# Patient Record
Sex: MALE
[De-identification: names, ages, dates, MRNs, and addresses within clinical notes are randomized per-mention and may not be internally consistent; named-entity substitution may affect disease eponyms.]

---

## 2018-08-11 ENCOUNTER — HOSPITAL ENCOUNTER (EMERGENCY)
Dept: HOSPITAL 97 - ER | Age: 38
Discharge: HOME | End: 2018-08-11
Payer: COMMERCIAL

## 2018-08-11 DIAGNOSIS — R51: Primary | ICD-10-CM

## 2018-08-11 DIAGNOSIS — I11.9: ICD-10-CM

## 2018-08-11 LAB
ALBUMIN SERPL BCP-MCNC: 3.1 G/DL (ref 3.4–5)
ALP SERPL-CCNC: 118 U/L (ref 45–117)
ALT SERPL W P-5'-P-CCNC: 39 U/L (ref 12–78)
AST SERPL W P-5'-P-CCNC: 23 U/L (ref 15–37)
BUN BLD-MCNC: 12 MG/DL (ref 7–18)
CKMB CREATINE KINASE MB: 1.7 NG/ML (ref 0.3–3.6)
GLUCOSE SERPLBLD-MCNC: 122 MG/DL (ref 74–106)
HCT VFR BLD CALC: 47 % (ref 39.6–49)
INR BLD: 1.1
LYMPHOCYTES # SPEC AUTO: 1.1 K/UL (ref 0.7–4.9)
MAGNESIUM SERPL-MCNC: 1.9 MG/DL (ref 1.8–2.4)
MCH RBC QN AUTO: 26.1 PG (ref 27–35)
MCV RBC: 82.8 FL (ref 80–100)
NT-PROBNP SERPL-MCNC: 130 PG/ML (ref ?–125)
PMV BLD: 7.7 FL (ref 7.6–11.3)
POTASSIUM SERPL-SCNC: 3.9 MMOL/L (ref 3.5–5.1)
RBC # BLD: 5.68 M/UL (ref 4.33–5.43)

## 2018-08-11 PROCEDURE — 82553 CREATINE MB FRACTION: CPT

## 2018-08-11 PROCEDURE — 80076 HEPATIC FUNCTION PANEL: CPT

## 2018-08-11 PROCEDURE — 82550 ASSAY OF CK (CPK): CPT

## 2018-08-11 PROCEDURE — 80048 BASIC METABOLIC PNL TOTAL CA: CPT

## 2018-08-11 PROCEDURE — 70450 CT HEAD/BRAIN W/O DYE: CPT

## 2018-08-11 PROCEDURE — 84484 ASSAY OF TROPONIN QUANT: CPT

## 2018-08-11 PROCEDURE — 93005 ELECTROCARDIOGRAM TRACING: CPT

## 2018-08-11 PROCEDURE — 83735 ASSAY OF MAGNESIUM: CPT

## 2018-08-11 PROCEDURE — 85730 THROMBOPLASTIN TIME PARTIAL: CPT

## 2018-08-11 PROCEDURE — 96361 HYDRATE IV INFUSION ADD-ON: CPT

## 2018-08-11 PROCEDURE — 81003 URINALYSIS AUTO W/O SCOPE: CPT

## 2018-08-11 PROCEDURE — 71045 X-RAY EXAM CHEST 1 VIEW: CPT

## 2018-08-11 PROCEDURE — 36415 COLL VENOUS BLD VENIPUNCTURE: CPT

## 2018-08-11 PROCEDURE — 99284 EMERGENCY DEPT VISIT MOD MDM: CPT

## 2018-08-11 PROCEDURE — 85025 COMPLETE CBC W/AUTO DIFF WBC: CPT

## 2018-08-11 PROCEDURE — 96360 HYDRATION IV INFUSION INIT: CPT

## 2018-08-11 PROCEDURE — 85610 PROTHROMBIN TIME: CPT

## 2018-08-11 PROCEDURE — 83880 ASSAY OF NATRIURETIC PEPTIDE: CPT

## 2018-08-11 NOTE — RAD REPORT
EXAM DESCRIPTION:  RAD - Chest Single View - 8/11/2018 8:38 pm

 

CLINICAL HISTORY:  hypertension

Chest pain.

 

COMPARISON:  No comparisons

 

FINDINGS:  Portable technique limits examination quality.

 

Mild interstitial pulmonary edema suspected. The heart appears significantly enlarged. No displaced f
ractures.

 

IMPRESSION:  Mild CHF suspected.

## 2018-08-11 NOTE — RAD REPORT
EXAM DESCRIPTION:  CT - Head Brain Wo Cont - 8/11/2018 8:16 pm

 

CLINICAL HISTORY:  elevated blood pressure;Headache

 

COMPARISON:  <Comparisons>

 

TECHNIQUE:  All CT scans are performed using dose optimization technique as appropriate and may inclu
de automated exposure control or mA/KV adjustment according to patient size.

 

FINDINGS:  No intracranial hemorrhage, hydrocephalus or extra-axial fluid collection.No areas of brai
n edema or evidence of midline shift.

 

The paranasal sinuses and mastoids are clear. The calvarium is intact.

 

IMPRESSION:  No acute intracranial abnormality.
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
## 2018-08-12 NOTE — EKG
Test Date:    2018-08-11               Test Time:    20:38:08

Technician:                                          

                                                     

MEASUREMENT RESULTS:                                       

Intervals:                                           

Rate:         88                                     

OH:           150                                    

QRSD:         94                                     

QT:           368                                    

QTc:          445                                    

Axis:                                                

P:            50                                     

OH:           150                                    

QRS:          74                                     

T:            32                                     

                                                     

INTERPRETIVE STATEMENTS:                                       

                                                     

Normal sinus rhythm

Normal ECG

No previous ECG available for comparison



Electronically Signed On 08-12-18 06:09:17 CDT by Sanjeev Mir

## 2018-09-30 ENCOUNTER — HOSPITAL ENCOUNTER (INPATIENT)
Dept: HOSPITAL 97 - ER | Age: 38
LOS: 3 days | Discharge: HOME | DRG: 291 | End: 2018-10-03
Attending: FAMILY MEDICINE | Admitting: FAMILY MEDICINE
Payer: COMMERCIAL

## 2018-09-30 DIAGNOSIS — I11.0: Primary | ICD-10-CM

## 2018-09-30 DIAGNOSIS — I50.31: ICD-10-CM

## 2018-09-30 DIAGNOSIS — Z98.84: ICD-10-CM

## 2018-09-30 DIAGNOSIS — G47.33: ICD-10-CM

## 2018-09-30 DIAGNOSIS — Z91.14: ICD-10-CM

## 2018-09-30 DIAGNOSIS — K80.20: ICD-10-CM

## 2018-09-30 DIAGNOSIS — E66.2: ICD-10-CM

## 2018-09-30 DIAGNOSIS — L03.311: ICD-10-CM

## 2018-09-30 LAB
ALBUMIN SERPL BCP-MCNC: 3.1 G/DL (ref 3.4–5)
ALP SERPL-CCNC: 95 U/L (ref 45–117)
ALT SERPL W P-5'-P-CCNC: 46 U/L (ref 12–78)
AST SERPL W P-5'-P-CCNC: 32 U/L (ref 15–37)
BUN BLD-MCNC: 11 MG/DL (ref 7–18)
GLUCOSE SERPLBLD-MCNC: 106 MG/DL (ref 74–106)
HCT VFR BLD CALC: 45.7 % (ref 39.6–49)
INR BLD: 1.26
LYMPHOCYTES # SPEC AUTO: 0.9 K/UL (ref 0.7–4.9)
MAGNESIUM SERPL-MCNC: 2.3 MG/DL (ref 1.8–2.4)
MCH RBC QN AUTO: 25.6 PG (ref 27–35)
MCV RBC: 81.9 FL (ref 80–100)
NT-PROBNP SERPL-MCNC: 729 PG/ML (ref ?–125)
PMV BLD: 7.7 FL (ref 7.6–11.3)
POTASSIUM SERPL-SCNC: 4.3 MMOL/L (ref 3.5–5.1)
RBC # BLD: 5.58 M/UL (ref 4.33–5.43)
TROPONIN (EMERG DEPT USE ONLY): < 0.02 NG/ML (ref 0–0.04)
UA DIPSTICK W REFLEX MICRO PNL UR: (no result)

## 2018-09-30 PROCEDURE — 36415 COLL VENOUS BLD VENIPUNCTURE: CPT

## 2018-09-30 PROCEDURE — 99285 EMERGENCY DEPT VISIT HI MDM: CPT

## 2018-09-30 PROCEDURE — 81003 URINALYSIS AUTO W/O SCOPE: CPT

## 2018-09-30 PROCEDURE — 83735 ASSAY OF MAGNESIUM: CPT

## 2018-09-30 PROCEDURE — 93005 ELECTROCARDIOGRAM TRACING: CPT

## 2018-09-30 PROCEDURE — 85610 PROTHROMBIN TIME: CPT

## 2018-09-30 PROCEDURE — 74176 CT ABD & PELVIS W/O CONTRAST: CPT

## 2018-09-30 PROCEDURE — 80076 HEPATIC FUNCTION PANEL: CPT

## 2018-09-30 PROCEDURE — 93306 TTE W/DOPPLER COMPLETE: CPT

## 2018-09-30 PROCEDURE — 83880 ASSAY OF NATRIURETIC PEPTIDE: CPT

## 2018-09-30 PROCEDURE — 82962 GLUCOSE BLOOD TEST: CPT

## 2018-09-30 PROCEDURE — 96374 THER/PROPH/DIAG INJ IV PUSH: CPT

## 2018-09-30 PROCEDURE — 84100 ASSAY OF PHOSPHORUS: CPT

## 2018-09-30 PROCEDURE — 87040 BLOOD CULTURE FOR BACTERIA: CPT

## 2018-09-30 PROCEDURE — 80048 BASIC METABOLIC PNL TOTAL CA: CPT

## 2018-09-30 PROCEDURE — 85025 COMPLETE CBC W/AUTO DIFF WBC: CPT

## 2018-09-30 PROCEDURE — 84484 ASSAY OF TROPONIN QUANT: CPT

## 2018-09-30 PROCEDURE — 84443 ASSAY THYROID STIM HORMONE: CPT

## 2018-09-30 PROCEDURE — 80053 COMPREHEN METABOLIC PANEL: CPT

## 2018-09-30 PROCEDURE — 71045 X-RAY EXAM CHEST 1 VIEW: CPT

## 2018-09-30 RX ADMIN — ENOXAPARIN SODIUM SCH MG: 40 INJECTION SUBCUTANEOUS at 18:46

## 2018-09-30 RX ADMIN — Medication SCH ML: at 22:05

## 2018-09-30 NOTE — RAD REPORT
EXAM DESCRIPTION:  RAD - Chest Single View - 9/30/2018 2:04 pm

 

CLINICAL HISTORY:  SOB

Chest pain.

 

COMPARISON:  Chest Single View dated 8/11/2018

 

FINDINGS:  Portable technique limits examination quality.

 

Mild pulmonary edema suspected. The heart is moderately enlarged in size. No displaced fractures.

 

IMPRESSION:  Mild CHF versus volume overload pattern.

## 2018-09-30 NOTE — P.HP
Certification for Inpatient


Patient admitted to: Observation


With expected LOS: <2 Midnights


Patient will require the following post-hospital care: None


Practitioner: I am a practitioner with admitting privileges, knowledge of 

patient current condition, hospital course, and medical plan of care.


Services: Services provided to patient in accordance with Admission 

requirements found in Title 42 Section 412.3 of the Code of Federal Regulations





Patient History


Date of Service: 09/30/18


Primary Care Provider: None


Reason for admission: SOB


History of Present Illness: 





This is a 30-year-old male with no significant past medical history of present 

to the ED complaining of having some shortness of breath.  Patient stated that 

his shortness of breath that started about 1 week ago and has got progressively 

worse.  Patient has also noticed some mild edema in bilateral lower extremity.  

Patient has been unable to lie flat and sleep at night and has been sleeping in 

his reclining chair or using several pillows as well.  Patient states that he 

works as a  and noticed that he has not been able to be as active 

as before due to shortness of breath.  The shortness of breath and did not get 

better he decided to come to the ER for further workup.  Patient does not have 

a primary care provider and denies smoking or alcohol at this time.  Patient 

also denies having any nausea vomiting chest pain abdominal pain or any other 

associated symptoms at this time.  Denies having fever and chills as well.





In the ER patient was found to have mild congestive heart failure on the chest x

-ray along with midly elevated pro BNP.  This was admitted to the hospital for 

further workup


Allergies





No Known Allergies Allergy (Uncoded 08/17/17 10:59)


 Unknown





Home medications list reviewed: Yes





- Past Medical/Surgical History


Has patient received pneumonia vaccine in the past: No


Diabetic: No


Past Medical History: Patient denies medical history


Past Surgical History: Reviewed- Non-Contributory


-: G[place] Bypass





- Family History


Family History: Reviewed- Non-Contributory





- Social History


Smoking Status: Never smoker


Counseled patient to stop smoking for: less than 10 minutes


Smoking therapy provided: No


Patient receptive to therapy: No


Alcohol use: No


CD- Drugs: No


Caffeine use: No


Place of Residence: Home





Review of Systems


10-point ROS is otherwise unremarkable





Physical Examination





- Physical Exam


General: Alert, Mild distress, Obese


HEENT: Atraumatic, PERRLA, Mucous membr. moist/pink, EOMI, Sclerae nonicteric


Neck: Supple, 2+ carotid pulse no bruit, No LAD, Without JVD or thyroid 

abnormality


Respiratory: Normal air movement, Crackles/rales


Cardiovascular: Regular rate/rhythm, Normal S1 S2


Gastrointestinal: Normal bowel sounds, No tenderness


Musculoskeletal: No tenderness


Integumentary: No rashes


Neurological: Normal speech, Normal strength at 5/5 x4 extr, Normal tone


Lymphatics: No axilla or inguinal lymphadenopathy





- Studies


Laboratory Data (last 24 hrs)





09/30/18 13:35: PT 14.9 H, INR 1.26


09/30/18 13:35: WBC 7.9, Hgb 14.3, Hct 45.7, Plt Count 239


09/30/18 13:35: Sodium 141, Potassium 4.3, BUN 11, Creatinine 0.80, Glucose 106

, Magnesium 2.3, Total Bilirubin 1.0, AST 32, ALT 46, Alkaline Phosphatase 95








Assessment and Plan





- Problems (Diagnosis)


(1) Dyspnea


Current Visit: Yes   Status: Acute   


Plan: 


Dyspnea with mild CHF on Xray 


-IV lasix 20mg 


-ECHO pending 


-Cardiology consulted. 


-Fluid Restriction and Low NA diet 





Qualifiers: 


   Dyspnea type: dyspnea on exertion   Qualified Code(s): R06.09 - Other forms 

of dyspnea   





(2) Morbid obesity


Current Visit: Yes   Status: Acute   


Plan: 


Due to access Calories 








Discharge Plan: Home


Plan to discharge in: 48 Hours





- Advance Directives


Does patient have a Living Will: No


Does patient have a Durable POA for Healthcare: No





- Code Status/Comfort Care


Code Status Assessed: Yes


Critical Care: No

## 2018-09-30 NOTE — ER
Nurse's Notes                                                                                     

 [facility]                                                                

Name: Chinyere Sarah III                                                                       

Age: 38 yrs                                                                                       

Sex: Male                                                                                         

: 1980                                                                                   

MRN: [id number]                                                                                   

Arrival Date: 2018                                                                          

Time: 13:03                                                                                       

Account#: [id number]                                                                            

Bed 23                                                                                            

Private MD:                                                                                       

Diagnosis: Systolic (congestive) heart failure;Cellulitis of abdominal wall                       

                                                                                                  

Presentation:                                                                                     

                                                                                             

13:13 Presenting complaint: Patient states: Lump in upper and lower abd, reports that "google sg  

      told me its a hernia." reports pain in the abd, denies trauma/falls, reports having         

      deifficulty breathing that is new for him. Transition of care: patient was not received     

      from another setting of care. Onset of symptoms was 2018. Risk                

      Assessment: Do you want to hurt yourself or someone else? Patient reports no desire to      

      harm self or others. Initial Sepsis Screen: Does the patient meet any 2 criteria? No.       

      Patient's initial sepsis screen is negative. Care prior to arrival: None.                   

13:13 Method Of Arrival: Ambulatory                                                           sg  

13:13 Acuity: YOSEF 2                                                                           iw  

17:02 Initial Sepsis Screen: Does the patient have a suspected source of infection? No.       tl3 

      Patient's initial sepsis screen is negative.                                                

                                                                                                  

Historical:                                                                                       

- Allergies:                                                                                      

13:17 No Known Allergies;                                                                     sg  

- Home Meds:                                                                                      

13:17 None [Active];                                                                          sg  

- PMHx:                                                                                           

17:02 Hypertension;                                                                           tl3 

- PSHx:                                                                                           

13:17 Gastric Bypass;                                                                         sg  

                                                                                                  

- Immunization history:: Adult Immunizations not up to date.                                      

- Social history:: Smoking status: Patient/guardian denies using tobacco.                         

- Ebola Screening: : Patient negative for fever greater than or equal to 101.5 degrees            

  Fahrenheit, and additional compatible Ebola Virus Disease symptoms Patient denies               

  exposure to infectious person Patient denies travel to an Ebola-affected area in the            

  21 days before illness onset No symptoms or risks identified at this time.                      

                                                                                                  

                                                                                                  

Screenin:21 Abuse screen: Denies threats or abuse. Nutritional screening: No deficits noted.        tl3 

      Tuberculosis screening: No symptoms or risk factors identified. Fall Risk None              

      identified.                                                                                 

                                                                                                  

Assessment:                                                                                       

13:20 General: Appears uncomfortable, obese, well groomed, well developed, well nourished,    tl3 

      Behavior is calm, cooperative, appropriate for age. Pain: Denies pain. Neuro: Level of      

      Consciousness is awake, alert, obeys commands, Oriented to person, place, time,             

      situation, Appropriate for age. Cardiovascular: Heart tones S1 S2 present Patient's         

      skin is warm and dry. Respiratory: Airway is patent Respiratory effort is even,             

      Respiratory pattern is regular, symmetrical, tachypnea Breath sounds are diminished         

      bilaterally. in left upper lobe, left lower lobe, right lower lobe, left posterior          

      upper lobe, left posterior lower lobe and right posterior lower lobe. GI: No signs          

      and/or symptoms were reported involving the gastrointestinal system. : No signs           

      and/or symptoms were reported regarding the genitourinary system. Urine is clear. EENT:     

      No signs and/or symptoms were reported regarding the EENT system. Derm: No signs and/or     

      symptoms reported regarding the dermatologic system. Musculoskeletal: No signs and/or       

      symptoms reported regarding the musculoskeletal system.                                     

14:00 Reassessment: No changes from previously documented assessment. Patient and/or family   tl3 

      updated on plan of care and expected duration. Pain level reassessed. Patient is alert,     

      oriented x 3, equal unlabored respirations, skin warm/dry/pink.                             

14:00 Reassessment: pt ambulating to restroom frequently.                                     tl3 

15:15 Reassessment: Patient appears in no apparent distress at this time. No changes from     tl3 

      previously documented assessment. Patient and/or family updated on plan of care and         

      expected duration. Pain level reassessed. Patient is alert, oriented x 3, equal             

      unlabored respirations, skin warm/dry/pink.                                                 

                                                                                                  

Vital Signs:                                                                                      

13:17  / 101; Pulse 86; Resp 22 S; Temp 98.8; Pulse Ox 88% on R/A; Weight 181.44 kg     sg  

      (R); Height 5 ft. 8 in. (172.72 cm); Pain 8/10;                                             

13:20  / 98; Pulse 85; Resp 18; Pulse Ox 79% on R/A;                                    tl3 

13:21 Pulse Ox 96% on 3 lpm NC;                                                               tl3 

15:15  / 114; Pulse 96; Resp 18; Pulse Ox 87% on 3 lpm NC;                              tl3 

13:17 Body Mass Index 60.82 (181.44 kg, 172.72 cm)                                              

                                                                                                  

ED Course:                                                                                        

13:03 Patient arrived in ED.                                                                  mr  

13:17 Triage completed.                                                                       sg  

13:17 Arm band placed on.                                                                     sg  

13:21 Patient has correct armband on for positive identification. Placed in gown. Bed in low  tl3 

      position. Call light in reach. Side rails up X 1. Adult w/ patient. Cardiac monitor on.     

      Pulse ox on. NIBP on. Warm blanket given. Pillow given.                                     

13:21 No provider procedures requiring assistance completed.                                  tl3 

13:30 Jadiel Rendon MD is Attending Physician.                                                

13:35 Inserted saline lock: 20 gauge in left antecubital area, using aseptic technique. Blood la1 

      collected.                                                                                  

13:46 Hazelton, Rochelle, RN is Primary Nurse.                                                     tl3 

14:02 X-ray completed. Portable x-ray completed in exam room. Patient tolerated procedure     tm4 

      well.                                                                                       

14:03 XRAY Chest (1 view) In Process Unspecified.                                             EDMS

15:01 Shavonne Cooper MD is Hospitalizing Provider.                                             

17:04 Patient admitted, IV remains in place.                                                  tl3 

                                                                                                  

Administered Medications:                                                                         

13:50 Drug: Lasix 40 mg Route: IVP; Infused Over: 2 mins; Site: left antecubital;             tl3 

15:12 Follow up: Response: No adverse reaction                                                tl3 

                                                                                                  

                                                                                                  

Outcome:                                                                                          

15:02 Decision to Hospitalize by Provider.                                                    gs  

17:02 Admitted to Med/surg accompanied by tech, via wheelchair, with chart, Report called to  tl3 

      MARIELLE Mcpherson                                                                                   

17:03 Condition: stable                                                                       tl3 

17:03 Instructed on the need for admit.                                                           

17:05 Patient left the ED.                                                                    tl3 

                                                                                                  

Signatures:                                                                                       

Dispatcher [employer]                           EDMS                                                 

Norbert Ryan RN RN                                                      

Aminta Dominguez Tracy                             tm4                                                  

Kira Morris RN                     RN                                                      

Avinash Walsh RN                         RN   la1                                                  

Jadiel Rendon MD MD                                                      

Rochelle Parrish, RN                       RN   tl3                                                  

                                                                                                  

Corrections: (The following items were deleted from the chart)                                    

13:22 13:13 Acuity: YOSEF 3 [facility]  

17:02 13:17 PMHx: None; sg                                                                    tl3 

                                                                                                  

**************************************************************************************************

## 2018-09-30 NOTE — EDPHYS
Physician Documentation                                                                           

 [facility]                                                                

Name: Chinyere Sarah III                                                                       

Age: 38 yrs                                                                                       

Sex: Male                                                                                         

: 1980                                                                                   

MRN: [id number]                                                                                   

Arrival Date: 2018                                                                          

Time: 13:03                                                                                       

Account#: [id number]                                                                            

Bed 23                                                                                            

Private MD:                                                                                       

ED Physician Jadiel Rendon                                                                       

HPI:                                                                                              

                                                                                             

14:50 This 38 yrs old  Male presents to ER via Ambulatory with complaints of SOB,     gs  

      Swelling.                                                                                   

14:56 The patient has shortness of breath at rest. Onset: The symptoms/episode began/occurred gs  

      2 week(s) ago, and became worse and became persistent. Duration: The symptoms are           

      continuous. The patient's shortness of breath is aggravated by exertion. Associated         

      signs and symptoms: Pertinent positives: non-productive cough, Pertinent negatives:         

      chest pain. Severity of symptoms: At their worst the symptoms were moderate in the          

      emergency department the symptoms are unchanged. The patient has not experienced            

      similar symptoms in the past.                                                               

                                                                                                  

Historical:                                                                                       

- Allergies:                                                                                      

13:17 No Known Allergies;                                                                     sg  

- Home Meds:                                                                                      

13:17 None [Active];                                                                          sg  

- PMHx:                                                                                           

17:02 Hypertension;                                                                           tl3 

- PSHx:                                                                                           

13:17 Gastric Bypass;                                                                         sg  

                                                                                                  

- Immunization history:: Adult Immunizations not up to date.                                      

- Social history:: Smoking status: Patient/guardian denies using tobacco.                         

- Ebola Screening: : Patient negative for fever greater than or equal to 101.5 degrees            

  Fahrenheit, and additional compatible Ebola Virus Disease symptoms Patient denies               

  exposure to infectious person Patient denies travel to an Ebola-affected area in the            

  21 days before illness onset No symptoms or risks identified at this time.                      

                                                                                                  

                                                                                                  

ROS:                                                                                              

14:56 All other systems are negative.                                                         gs  

                                                                                                  

Exam:                                                                                             

14:56 Head/Face:  Normocephalic, atraumatic. Eyes:  Pupils equal round and reactive to light, gs  

      extra-ocular motions intact.  Lids and lashes normal.  Conjunctiva and sclera are           

      non-icteric and not injected.  Cornea within normal limits.  Periorbital areas with no      

      swelling, redness, or edema. ENT:  Nares patent. No nasal discharge, no septal              

      abnormalities noted.  Tympanic membranes are normal and external auditory canals are        

      clear.  Oropharynx with no redness, swelling, or masses, exudates, or evidence of           

      obstruction, uvula midline.  Mucous membranes moist. Neck:  Trachea midline, no             

      thyromegaly or masses palpated, and no cervical lymphadenopathy.  Supple, full range of     

      motion without nuchal rigidity, or vertebral point tenderness.  No Meningismus.             

      Chest/axilla:  Normal chest wall appearance and motion.  Nontender with no deformity.       

      No lesions are appreciated. Cardiovascular:  Regular rate and rhythm with a normal S1       

      and S2.  No gallops, murmurs, or rubs.  Normal PMI, no JVD.  No pulse deficits.             

14:56 Abdomen/GI:  Soft, non-tender, with normal bowel sounds.  No distension or tympany.  No     

      guarding or rebound.  No evidence of tenderness throughout. Back:  No spinal                

      tenderness.  No costovertebral tenderness.  Full range of motion.                           

14:56 Constitutional: The patient appears alert, awake.                                           

14:56 Cardiovascular: Edema: 2+ edema to level of pubic area, left upper thigh, left lower        

      thigh, left knee, left midcalf, left ankle, right upper thigh, right lower thigh, right     

      knee, right midcalf and right ankle.                                                        

14:56 ECG was reviewed by the Attending Physician.                                                

14:56 Respiratory: mild respiratory distress is noted,  Respirations: tachypnea, Breath           

      sounds: rales, are located in both bases, Respiratory rate:  22                             

14:56 Skin: cellulitis, that is mild, on the  suprapubic area, right lower quadrant and left      

      lower quadrant.                                                                             

14:56 Neuro: Exam negative for acute changes.                                                     

                                                                                                  

Vital Signs:                                                                                      

13:17  / 101; Pulse 86; Resp 22 S; Temp 98.8; Pulse Ox 88% on R/A; Weight 181.44 kg     sg  

      (R); Height 5 ft. 8 in. (172.72 cm); Pain 8/10;                                             

13:20  / 98; Pulse 85; Resp 18; Pulse Ox 79% on R/A;                                    tl3 

13:21 Pulse Ox 96% on 3 lpm NC;                                                               tl3 

15:15  / 114; Pulse 96; Resp 18; Pulse Ox 87% on 3 lpm NC;                              tl3 

13:17 Body Mass Index 60.82 (181.44 kg, 172.72 cm)                                              

                                                                                                  

MDM:                                                                                              

13:41 Patient medically screened.                                                               

14:56 Differential diagnosis: CHF exacerbation, Myocardial Infarction pulmonary edema,        gs  

      abdominal wall cellulitis. Data reviewed: vital signs, nurses notes.                        

                                                                                                  

                                                                                             

13:41 Order name: Basic Metabolic Panel; Complete Time: 14:49                                   

                                                                                             

13:41 Order name: CBC with Diff; Complete Time: 14:32                                           

                                                                                             

13:41 Order name: LFT's; Complete Time: 14:49                                                   

                                                                                             

13:41 Order name: Magnesium; Complete Time: 14:49                                               

                                                                                             

13:41 Order name: NT PRO-BNP; Complete Time: 14:49                                              

                                                                                             

13:41 Order name: PT-INR; Complete Time: 14:32                                                  

                                                                                             

13:41 Order name: Troponin (emerg Dept Use Only); Complete Time: 14:49                          

                                                                                             

13:41 Order name: XRAY Chest (1 view); Complete Time: 14:32                                     

                                                                                             

13:41 Order name: EKG; Complete Time: 13:42                                                     

                                                                                             

13:41 Order name: Cardiac monitoring; Complete Time: 14:11                                      

                                                                                             

13:41 Order name: EKG - Nurse/Tech; Complete Time: 14:11                                        

                                                                                             

13:41 Order name: Blood Culture*                                                                

                                                                                             

14:04 Order name: Urine Dipstick--Ancillary (enter results); Complete Time: 14:32               

                                                                                             

13:41 Order name: IV Saline Lock; Complete Time: 14:11                                          

                                                                                             

13:41 Order name: Labs collected and sent; Complete Time: 14:11                                 

                                                                                             

13:41 Order name: O2 Per Protocol; Complete Time: 15:46                                         

                                                                                             

13:41 Order name: O2 Sat Monitoring; Complete Time: 15:46                                     gs  

                                                                                                  

EC:56 Rate is 81 beats/min. Rhythm is regular. IN interval is normal. QRS interval is normal. gs  

      QT interval is normal. T waves are Flattened. Clinical impression: NSR w/ Non-specific      

      ST/T Changes. Interpreted by me.                                                            

                                                                                                  

Administered Medications:                                                                         

13:50 Drug: Lasix 40 mg Route: IVP; Infused Over: 2 mins; Site: left antecubital;             tl3 

15:12 Follow up: Response: No adverse reaction                                                tl3 

                                                                                                  

                                                                                                  

Disposition:                                                                                      

18 15:02 Hospitalization ordered by Shavonne Cooper for Inpatient Admission. Preliminary     

  diagnosis are Systolic (congestive) heart failure, Cellulitis of abdominal                      

  wall.                                                                                           

- Bed requested for Telemetry/MedSurg (Inpatient).                                                

- Status is Inpatient Admission.                                                              tl3 

- Condition is Stable.                                                                            

- Problem is new.                                                                                 

- Symptoms have improved.                                                                         

UTI on Admission? No                                                                              

                                                                                                  

                                                                                                  

                                                                                                  

Signatures:                                                                                       

Dispatcher [employer]                           EDMS                                                 

Portia Painter RN                        RN                                                      

Norbert Ryan RN                         RN                                                      

Jadiel Rendon MD MD                                                      

Rochelle Parrish RN                       RN   3                                                  

Lakisha Tracey                                                   

                                                                                                  

Corrections: (The following items were deleted from the chart)                                    

15:08 15:02 Hospitalization Ordered by Shavonne Cooper MD for Inpatient Admission. Preliminary  eb  

      diagnosis is Systolic (congestive) heart failure; Cellulitis of abdominal wall. Bed         

      requested for Telemetry/MedSurg (Inpatient). Status is Inpatient Admission. Condition       

      is Stable. Problem is new. Symptoms have improved. UTI on Admission? No.                  

16:34 15:08 2018 15:02 Hospitalization Ordered by Shavonne Cooper MD for Inpatient          

      Admission. Preliminary diagnosis is Systolic (congestive) heart failure; Cellulitis of      

      abdominal wall. Bed requested for Telemetry/MedSurg (Inpatient). Status is Inpatient        

      Admission. Condition is Stable. Problem is new. Symptoms have improved. UTI on              

      Admission? No. eb                                                                           

17:02 13:17 PMHx: None; [facility] 

17:05 16:34 2018 15:02 Hospitalization Ordered by Shavonne Cooper MD for Inpatient        tl3 

      Admission. Preliminary diagnosis is Systolic (congestive) heart failure; Cellulitis of      

      abdominal wall. Bed requested for Telemetry/MedSurg (Inpatient). Status is Inpatient        

      Admission. Condition is Stable. Problem is new. Symptoms have improved. UTI on              

      Admission? No. dw                                                                           

                                                                                                  

**************************************************************************************************

## 2018-10-01 LAB
ALBUMIN SERPL BCP-MCNC: 3.1 G/DL (ref 3.4–5)
ALP SERPL-CCNC: 98 U/L (ref 45–117)
ALT SERPL W P-5'-P-CCNC: 53 U/L (ref 12–78)
AST SERPL W P-5'-P-CCNC: 36 U/L (ref 15–37)
BUN BLD-MCNC: 12 MG/DL (ref 7–18)
GLUCOSE SERPLBLD-MCNC: 109 MG/DL (ref 74–106)
HCT VFR BLD CALC: 46.5 % (ref 39.6–49)
LYMPHOCYTES # SPEC AUTO: 0.9 K/UL (ref 0.7–4.9)
MCH RBC QN AUTO: 26.1 PG (ref 27–35)
MCV RBC: 81.5 FL (ref 80–100)
PMV BLD: 7.9 FL (ref 7.6–11.3)
POTASSIUM SERPL-SCNC: 3.7 MMOL/L (ref 3.5–5.1)
RBC # BLD: 5.7 M/UL (ref 4.33–5.43)

## 2018-10-01 RX ADMIN — FUROSEMIDE SCH MG: 10 INJECTION, SOLUTION INTRAVENOUS at 09:15

## 2018-10-01 RX ADMIN — ENOXAPARIN SODIUM SCH MG: 40 INJECTION SUBCUTANEOUS at 17:28

## 2018-10-01 RX ADMIN — LOSARTAN POTASSIUM SCH MG: 50 TABLET, FILM COATED ORAL at 11:14

## 2018-10-01 RX ADMIN — Medication SCH ML: at 21:40

## 2018-10-01 RX ADMIN — AMLODIPINE BESYLATE SCH MG: 10 TABLET ORAL at 10:50

## 2018-10-01 RX ADMIN — LOSARTAN POTASSIUM SCH MG: 50 TABLET, FILM COATED ORAL at 10:50

## 2018-10-01 RX ADMIN — Medication SCH ML: at 09:00

## 2018-10-01 NOTE — P.PN
Subjective


Date of Service: 10/01/18


Primary Care Provider: None


Chief Complaint: SOB





Pt seen and examined at bedside with RN. Chart Reviewed. Case DW with 

Cardiology. Pending ECHO Today. Pt denies SOB today. Feels better than before 








Review of Systems


10-point ROS is otherwise unremarkable





Physical Examination





- Vital Signs


Temperature: 98.7 F


Blood Pressure: 194/103


Pulse: 95


Respirations: 20


Pulse Ox (%): 95





- Physical Exam


General: Alert, In no apparent distress, Obese


HEENT: Atraumatic, PERRLA, EOMI


Neck: Supple, JVD not distended


Respiratory: Clear to auscultation bilaterally, Normal air movement


Cardiovascular: Regular rate/rhythm, Normal S1 S2


Gastrointestinal: Normal bowel sounds, No tenderness, Other (Right Inguinal 

Hernia noted)


Musculoskeletal: No tenderness


Integumentary: No rashes


Neurological: Normal speech, Normal tone, Normal affect


Lymphatics: No axilla or inguinal lymphadenopathy





- Studies


Laboratory Data (last 24 hrs)





09/30/18 13:35: PT 14.9 H, INR 1.26


09/30/18 13:35: WBC 7.9, Hgb 14.3, Hct 45.7, Plt Count 239


09/30/18 13:35: Sodium 141, Potassium 4.3, BUN 11, Creatinine 0.80, Glucose 106

, Magnesium 2.3, Total Bilirubin 1.0, AST 32, ALT 46, Alkaline Phosphatase 95





Medications List Reviewed: Yes





Assessment And Plan





- Current Problems (Diagnosis)


(1) Dyspnea


Onset Date: 10/01/18   Current Visit: Yes   Status: Acute   


Plan: 


Dyspnea with mild CHF on Xray 


-IV lasix 20mg for now


-ECHO pending 


-Cardiology consulted. Appreciated Reccs


   -Amlodipine and Losartan 


-Fluid Restriction and Low NA diet 





Qualifiers: 


   Dyspnea type: dyspnea on exertion   Qualified Code(s): R06.09 - Other forms 

of dyspnea   





(2) Morbid obesity


Onset Date: 10/01/18   Current Visit: Yes   Status: Acute   


Plan: 


Due to access Calories 


-Diet and exercise. 


-Pt was educated on the risk of morbid obesity and Heart Disease. 





Discharge Plan: Home


Plan to discharge in: 48 Hours





- Code Status/Comfort Care


Code Status Assessed: Yes


Critical Care: No

## 2018-10-01 NOTE — RAD REPORT
EXAM DESCRIPTION:  CT - Abdomen   Pelvis Wo Contrast - 10/1/2018 2:14 pm

 

CLINICAL HISTORY:  Abdominal pain

 

COMPARISON:  None.

 

TECHNIQUE:  Axial 5 mm thick CT imaging of the abdomen and pelvis was performed without IV contrast. 
No IV contrast was given because of allergy, abnormal renal function, patient refusal or physician re
quest. Oral contrast was given.

 

All CT scans are performed using dose optimization technique as appropriate and may include automated
 exposure control or mA/KV adjustment according to patient size.

 

FINDINGS:  No suspicious findings in the lung bases. No pericardial effusion.

 

Liver and spleen show no suspicious findings on noncontrast imaging. No biliary tree dilatation. Gall
bladder is normal size. At least 1 small gallstone is present. Additional stones and sludge could be 
occult. No mass of the pancreas seen. No significant peripancreatic stranding seen.

 

No hydronephrosis or suspicious renal mass.  No significant adrenal finding. Isodense renal masses an
d pyelonephritis cannot be excluded in the absence of IV contrast. The urinary bladder is without sig
nificant finding. No adrenal abnormality.

 

No gastric dilatation or wall thickening. Lap band changes are noted. No dilated large or small bowel
 loops. No appendicitis findings. Acute GI process is not identifiable. No free air, free fluid or in
flammatory stranding. No hernia, mass or bulky lymphadenopathy.

 

Lower lumbar degenerative changes are present. No pathologic bone process.

 

 

IMPRESSION:  No obstruction, free air or surgically emergent finding.

 

Cholelithiasis without acute gallbladder or biliary tree finding seen.

 

No acute  or GI process identifiable.

 

Exam is significantly limited due to large body habitus and the absence of IV contrast.

## 2018-10-01 NOTE — CON
Chief Complaint:  Shortness of breath.



History Of Present Illness:  Mr. Sarah, for a week and a half, has been more short of breath than
 usual.  He is gaining more fluid than usual.  Weight is going up.  He has orthopnea.  There is no ch
est pain.  Since he has been in the hospital, he has been remarkably hypertensive.  The most recent b
lood pressure 194/103.  The patient has morbid obesity.  Present weight is 458 pounds.  Twelve years 
ago, he underwent a gastric band and it helped him lose about 80 pounds of weight, but since then, he
 has gained that weight back plus at least another 100 pounds.  Presently, he takes no medications.  
A month ago, he was in our hospital, and we recommended taking a blood pressure medicine, but he is n
ot taking a blood pressure medicine now.  He uses no tobacco.  Denies any history of diabetes.  No hi
story of myocardial infarction or stroke.  Blood sugars are 106 and 109, creatinine 0.8.  Hemoglobin 
and hematocrit are normal.  N-terminal proBNP is 729, which is mildly elevated; normal is less than 1
25.  The electrocardiogram shows everything is normal.



Physical Examination:

Vital signs:  He is 5 feet and 8 inches, 458 pounds. 

General:  Alert, oriented, pleasant. 

Lungs:  Revealed decreased breath sounds in all lung fields. 

Heart:  Distant heart tones.  No murmur, rub, or gallop. 

Abdomen:  Soft. 

Extremities:  3+ edema.  Distal pulses not palpable, probably from edema.



Impression:  The patient needs to consider going to repeat bariatric surgery to lose weight.  He is t
o be on blood pressure medicines, which would include diuretics, and he needs to have an echocardiogr
am 

sometime today, it is already ordered.  I suspect it will be done early this afternoon.





DANAE

DD:  10/01/2018 09:35:59Voice ID:  [id number]

DT:  10/01/2018 14:03:08Report ID:  [id number]

## 2018-10-01 NOTE — EKG
Test Date:    2018-09-30               Test Time:    14:05:54

Technician:   TL                                     

                                                     

MEASUREMENT RESULTS:                                       

Intervals:                                           

Rate:         81                                     

VT:           156                                    

QRSD:         96                                     

QT:           384                                    

QTc:          446                                    

Axis:                                                

P:            54                                     

VT:           156                                    

QRS:          75                                     

T:            23                                     

                                                     

INTERPRETIVE STATEMENTS:                                       

                                                     

Normal sinus rhythm

Normal ECG

Compared to ECG 08/11/2018 20:38:08

No significant changes



Electronically Signed On 10-01-18 07:40:12 CDT by Sanjeev Mir

## 2018-10-02 LAB
ALBUMIN SERPL BCP-MCNC: 2.8 G/DL (ref 3.4–5)
ALP SERPL-CCNC: 86 U/L (ref 45–117)
ALT SERPL W P-5'-P-CCNC: 44 U/L (ref 12–78)
AST SERPL W P-5'-P-CCNC: 30 U/L (ref 15–37)
BUN BLD-MCNC: 14 MG/DL (ref 7–18)
GLUCOSE SERPLBLD-MCNC: 114 MG/DL (ref 74–106)
HCT VFR BLD CALC: 45.3 % (ref 39.6–49)
LYMPHOCYTES # SPEC AUTO: 0.7 K/UL (ref 0.7–4.9)
MCH RBC QN AUTO: 26.3 PG (ref 27–35)
MCV RBC: 82.2 FL (ref 80–100)
PMV BLD: 8 FL (ref 7.6–11.3)
POTASSIUM SERPL-SCNC: 4.1 MMOL/L (ref 3.5–5.1)
RBC # BLD: 5.51 M/UL (ref 4.33–5.43)

## 2018-10-02 RX ADMIN — AMLODIPINE BESYLATE SCH MG: 10 TABLET ORAL at 09:36

## 2018-10-02 RX ADMIN — ENOXAPARIN SODIUM SCH MG: 40 INJECTION SUBCUTANEOUS at 17:31

## 2018-10-02 RX ADMIN — LOSARTAN POTASSIUM SCH MG: 50 TABLET, FILM COATED ORAL at 09:36

## 2018-10-02 RX ADMIN — Medication SCH ML: at 09:00

## 2018-10-02 RX ADMIN — Medication SCH ML: at 21:42

## 2018-10-02 RX ADMIN — FUROSEMIDE SCH MG: 10 INJECTION, SOLUTION INTRAVENOUS at 09:37

## 2018-10-02 NOTE — ECHO
HEIGHT: 5 ft 8 in   WEIGHT: 453 lb 14.4 oz   DATE OF STUDY: 10/01/2018   REFER DR: Shavonne Cooper MD

2-DIMENSIONAL: YES

     M.MODE: YES

 DOPPLER: YES

COLOR FLOW: YES



                    TDS:  YES

PORTABLE: 

 DEFINITY:  

BUBBLE STUDY: 





DIAGNOSIS:  CONGESTIVE HEART FAILURE



CARDIAC HISTORY:  

CATHERIZATION: NO

SURGERY: NO

PROSTHETIC VALVE: NO

PACEMAKER: NO





MEASUREMENTS (cm)

    DIASTOLIC (NORMALS)                 SYSTOLIC (NORMALS)

IVSd                 1.2 (0.6-1.2)                    LA Diam 4.6 (1.9-4.0)     LVEF       
  60-69%  

LVIDd               5.2 (3.5-5.7)                        LVIDs      3.6 (2.0-3.5)     %FS  
        30%

LVPWd             1.3 (0.6-1.2)

Ao Diam           2.9 (2.0-3.7)



2 DIMENSIONAL ASSESSMENT:

RIGHT ATRIUM:                   NORMAL

LEFT ATRIUM:       DILATED



RIGHT VENTRICLE:            NORMAL

LEFT VENTRICLE: LEFT VENTRICULAR HYPERTROPHY



TRICUSPID VALVE:             NORMAL

MITRAL VALVE:     NORMAL



PULMONIC VALVE:             NORMAL

AORTIC VALVE:     NORMAL



PERICARDIAL EFFUSION: NONE

AORTIC ROOT:      NORMAL





LEFT VENTRICULAR WALL MOTION:     NORMAL



DOPPLER/COLOR FLOW:     PHYSIOLOGIC TRICUSPID REGURGITATION.  NORMAL RIGHT VENTRICULAR 
SYSTOLIC PRESSURE.



COMMENTS:      NORMAL LEFT VENTRICULAR EJECTION FRACTION.  

                            LEFT VENTRICULAR EJECTION FRACTION.  DILATED LEFT ATRIUM.



TECHNOLOGIST:   BHUPENDRA RYDER

## 2018-10-03 LAB
ALBUMIN SERPL BCP-MCNC: 2.9 G/DL (ref 3.4–5)
ALP SERPL-CCNC: 83 U/L (ref 45–117)
ALT SERPL W P-5'-P-CCNC: 46 U/L (ref 12–78)
AST SERPL W P-5'-P-CCNC: 29 U/L (ref 15–37)
BUN BLD-MCNC: 14 MG/DL (ref 7–18)
GLUCOSE SERPLBLD-MCNC: 96 MG/DL (ref 74–106)
HCT VFR BLD CALC: 46.4 % (ref 39.6–49)
LYMPHOCYTES # SPEC AUTO: 0.9 K/UL (ref 0.7–4.9)
MCH RBC QN AUTO: 26.1 PG (ref 27–35)
MCV RBC: 81.2 FL (ref 80–100)
PMV BLD: 7.3 FL (ref 7.6–11.3)
POTASSIUM SERPL-SCNC: 4.1 MMOL/L (ref 3.5–5.1)
RBC # BLD: 5.72 M/UL (ref 4.33–5.43)

## 2018-10-03 RX ADMIN — Medication SCH ML: at 08:47

## 2018-10-03 RX ADMIN — FUROSEMIDE SCH MG: 10 INJECTION, SOLUTION INTRAVENOUS at 08:47

## 2018-10-03 RX ADMIN — AMLODIPINE BESYLATE SCH MG: 10 TABLET ORAL at 08:47

## 2018-10-03 RX ADMIN — LOSARTAN POTASSIUM SCH MG: 50 TABLET, FILM COATED ORAL at 08:47

## 2018-10-03 RX ADMIN — ENOXAPARIN SODIUM SCH: 40 INJECTION SUBCUTANEOUS at 17:39

## 2018-10-03 NOTE — DS
Date of Discharge:  10/02/2018



Consultants:  Dr. Olivera and Dr. Mir with Cardiology.



Admitting Diagnoses:  

1.   Shortness of breath.

2.   Morbid obesity.



Discharge Diagnoses:  

1.   Shortness of breath secondary to congestive heart failure.

2.   New onset congestive heart failure, diastolic dysfunction.

3.   Left ventricular hypertrophy, hypertensive heart disease.

4.   Morbid obesity.

5.   Likely obstructive sleep apnea.

6.   Hypertension.

7.   Noncompliance.



Hospital Course:  The patient is a 38-year-old male with no significant past 
medical history other than hypertension and morbid obesity.  The patient was 
admitted for shortness of breath.  This was thought to be secondary to new 
onset congestive heart failure.  Cardiology was consulted.  The patient was 
started on diuresis.  Echocardiogram did show normal EF.  The patient did have 
some left ventricular hypertrophy, and the patient has not been compliant with 
his blood pressure medications from previous visit recently.  The patient was 
then counseled extensively.  He also was complaining of some bulging in his 
groin area.  However, no hernia was found.  He does have large abdominal 
pannus.  CT scan was done, did not show any obstruction, free air, or 
surgically emergent finding.  Cholelithiases were found without any gallbladder 
or biliary tree finding.  The patient was instructed to follow up with surgeon 
as an outpatient once his cardiac symptoms have improved and stabilized for 
possible elective surgery after cardiac clearance.  The patient was also 
encouraged to follow up with his bariatric surgeon as he has had bariatric 
surgery in the past, however, relapsed and gained significant amount of weight.
  He understands that if he does not make drastic changes in his life and 
obtain bariatric surgery to lose weight as he is 453 pounds, his mortality rate 
is high over the next 5 years.  He does have symptoms of sleep apnea, and he 
will need an outpatient sleep study.  He states that his brother and father 
also have sleep apnea and he is being established with Dr. Aston Pizano and he 
intends to follow up with him and have the sleep study ordered.  The patient 
otherwise did well.  He will be weaned off oxygen.  His blood pressure 
improved.  The patient will be discharged on amlodipine, Lasix, and losartan.



Followup:  Follow up with primary care physician in 2 to 3 days.  Follow up 
with cardiologist, Dr. Mir, in 2 weeks.  Follow up with bariatric surgeon in 
2 to 4 weeks.  The patient needs sleep study as an outpatient.



Diet:  Low-sodium, fluid-restricted diet.



Activity:  As tolerated.



Physical Examination:

General:  Awake, alert, oriented x3.  No acute distress.  Morbidly obese male. 

CV:  S1, S2.  No murmurs. 

Respiratory:  Moving air well bilaterally. 

Abdomen:  Soft, nontender, nondistended.  Obese.  Large abdominal pannus. 

Extremities:  No clubbing, cyanosis.  Some trace edema. 

Neurologic:  Nonfocal.



ADDENDUM: Patient was unable to be weaned off O2 and will need home O2 set up.



/CARLOS

DD:  10/02/2018 14:09:04   Voice ID:  [id number]

DT:  10/03/2018 05:06:57   Report ID:  [id number]

FRANCES

## 2018-10-03 NOTE — PN
Admitted by Dr. Henson on 09/30/2018 for congestive heart failure.  Dr. Mir has been following him.
  He saw him on 10/01/2018.  Echocardiogram which was done yesterday was normal.  I think most of Mr. Sarah' problem is Pickwickian syndrome, obesity that is morbid, sleep apnea.  Maybe some congest
nevaeh heart failure secondary to diastolic dysfunction when he becomes hypertensive.  His blood pressur
e has improved.  Certainly, he is on amlodipine and losartan.  We can certainly add a diuretic and anna perea a beta-blocker as well if his blood pressure needs to.  Sleep apnea study would be recommended.  
Pulmonology followup will be recommended.  We will sign off his case for now.





NB/CARLOS

DD:  10/02/2018 20:22:36Voice ID:  [id number]

DT:  10/03/2018 01:04:57Report ID:  [id number]

## 2018-10-03 NOTE — PN
Date of Progress Note:  10/03/2018



Subjective:  The patient seen and examined.  Chart reviewed and case discussed with RN.  The patient 
was unable to be discharged yesterday due to home oxygen needing to be set up.  He was unable to wean
ed off.  Today, he is feeling better.  No chest pain or shortness of breath.  Seems to be doing okay.




Review of Systems:

Negative except as above.



Medications:  List reviewed.



Physical Examination:

Vital Signs:  Temperature 98.1, heart rate 88, blood pressure 128/60, respirations 16, O2 94% on 3 L 
via nasal cannula. 

General:  Awake, alert, oriented x3, not in acute distress.  Morbidly obese male. 

CV:  S1, S2.  No murmurs.  Peripheral pulses present. 

Respiratory:  Distant breath sounds.  Otherwise, the patient is moving air well.  No tachypnea.  No u
se of accessory muscles.  No wheezing. 

Gastrointestinal:  Abdomen is soft, nontender, nondistended.  Positive bowel sounds. 

Extremities:  No clubbing, cyanosis.  The patient does have 2+ edema bilateral lower extremities. 

Neurologic:  Nonfocal.



Laboratory Data:  Sodium 137, potassium 4.1, chloride 96, CO2 39, BUN 14, creatinine 0.7, glucose 96,
 calcium 8.6.  WBC 7.7, H and H 14.9 and 46.4, platelets 238, neutrophils 75%.  Blood cultures no gomez
wt to date.



Assessment And Plan:  A 38-year-old male with:

1.Shortness of breath secondary to congestive heart failure.

2.New onset congestive heart failure, diastolic dysfunction, improving.

3.Left ventricular hypertrophy, hypertensive heart disease.

4.Morbid obesity.

5.Obstructive sleep apnea.  We will need outpatient sleep study.

6.Pickwickian syndrome.

7.Essential hypertension.

8.Noncompliance. 

Plan:  We will discharge home once home O2 is set up.  The patient has picked his PCP.  He will need 
to follow up before the weekend and have a sleep study referral stent from his PCPs office.  Again, nora
atrodo counseled on dietary restrictions, wife at the bedside.  Treatment plan explained.  All questi
ons answered.





/CARLOS

DD:  10/03/2018 13:27:28Voice ID:  [id number]

DT:  10/03/2018 18:32:00Report ID:  [id number]

## 2020-05-31 ENCOUNTER — HOSPITAL ENCOUNTER (EMERGENCY)
Dept: HOSPITAL 97 - ER | Age: 40
Discharge: HOME | End: 2020-05-31
Payer: COMMERCIAL

## 2020-05-31 VITALS — OXYGEN SATURATION: 96 % | DIASTOLIC BLOOD PRESSURE: 79 MMHG | SYSTOLIC BLOOD PRESSURE: 126 MMHG

## 2020-05-31 VITALS — TEMPERATURE: 97.7 F

## 2020-05-31 DIAGNOSIS — M79.662: Primary | ICD-10-CM

## 2020-05-31 DIAGNOSIS — I10: ICD-10-CM

## 2020-05-31 DIAGNOSIS — I50.9: ICD-10-CM

## 2020-05-31 LAB
ALBUMIN SERPL BCP-MCNC: 3.6 G/DL (ref 3.4–5)
ALP SERPL-CCNC: 88 U/L (ref 45–117)
ALT SERPL W P-5'-P-CCNC: 27 U/L (ref 12–78)
AST SERPL W P-5'-P-CCNC: 15 U/L (ref 15–37)
BUN BLD-MCNC: 14 MG/DL (ref 7–18)
GLUCOSE SERPLBLD-MCNC: 111 MG/DL (ref 74–106)
HCT VFR BLD CALC: 42 % (ref 39.6–49)
INR BLD: 1.13
LYMPHOCYTES # SPEC AUTO: 1.5 K/UL (ref 0.7–4.9)
MAGNESIUM SERPL-MCNC: 2.1 MG/DL (ref 1.8–2.4)
NT-PROBNP SERPL-MCNC: 14 PG/ML (ref ?–125)
PMV BLD: 7.5 FL (ref 7.6–11.3)
POTASSIUM SERPL-SCNC: 3.8 MMOL/L (ref 3.5–5.1)
RBC # BLD: 4.77 M/UL (ref 4.33–5.43)
TROPONIN (EMERG DEPT USE ONLY): < 0.02 NG/ML (ref 0–0.04)

## 2020-05-31 PROCEDURE — 85610 PROTHROMBIN TIME: CPT

## 2020-05-31 PROCEDURE — 83880 ASSAY OF NATRIURETIC PEPTIDE: CPT

## 2020-05-31 PROCEDURE — 93005 ELECTROCARDIOGRAM TRACING: CPT

## 2020-05-31 PROCEDURE — 99284 EMERGENCY DEPT VISIT MOD MDM: CPT

## 2020-05-31 PROCEDURE — 80076 HEPATIC FUNCTION PANEL: CPT

## 2020-05-31 PROCEDURE — 80048 BASIC METABOLIC PNL TOTAL CA: CPT

## 2020-05-31 PROCEDURE — 85379 FIBRIN DEGRADATION QUANT: CPT

## 2020-05-31 PROCEDURE — 83735 ASSAY OF MAGNESIUM: CPT

## 2020-05-31 PROCEDURE — 36415 COLL VENOUS BLD VENIPUNCTURE: CPT

## 2020-05-31 PROCEDURE — 93971 EXTREMITY STUDY: CPT

## 2020-05-31 PROCEDURE — 84484 ASSAY OF TROPONIN QUANT: CPT

## 2020-05-31 PROCEDURE — 85025 COMPLETE CBC W/AUTO DIFF WBC: CPT

## 2020-05-31 NOTE — RAD REPORT
[facility] DES[facility]  [facility] 8[facility]41 pm

 

CLINICAL HISTORY:  left leg pain

 

COMPARISON:  None.

 

FINDINGS:  Left common femoral, superficial femoral, popliteal and  posterior tibial veins are compre
ssible and demonstrate augmentation. Doppler demonstrates good flow.

 

IMPRESSION:  No evidence of deep venous thrombosis involving the left lower extremity.

## 2020-06-01 NOTE — ER
Nurse's Notes                                                                                     

 [facility]                                                                 

Name: Chinyere Sarah III                                                                       

Age: 40 yrs                                                                                       

Sex: Male                                                                                         

: 1980                                                                                   

MRN: [id number]                                                                                   

Arrival Date: 2020                                                                          

Time: 18:04                                                                                       

Account#: [id number]                                                                            

Bed 2                                                                                             

Private MD: Aston Pizano                                                                         

Diagnosis: Right calf pain                                                                        

                                                                                                  

Presentation:                                                                                     

                                                                                             

18:29 Chief complaint: Patient states: L leg pain since x 2 weeks, hurts more when ambulating ca1 

      and weight bearing. Pain is described as sharp, and intermittent. Denies injury to the      

      L leg. Coronavirus screen: Proceed with normal triage. Patient denies a cough. Patient      

      denies shortness of breath or difficulty breathing. Patient denies measured and/or          

      subjective temperature greater than 100.4F prior to today's visit. Patient denies           

      travel on a cruise ship or to a country the [facility] currently lists as an affected area.        

      Patient denies contact with known and/or suspected case of COVID-19. Ebola Screen:          

      Patient negative for fever greater than or equal to 101.5 degrees Fahrenheit, and           

      additional compatible Ebola Virus Disease symptoms Patient denies exposure to               

      infectious person. Patient denies travel to an Ebola-affected area in the 21 days           

      before illness onset. No symptoms or risks identified at this time. Initial Sepsis          

      Screen: Does the patient meet any 2 criteria? No. Patient's initial sepsis screen is        

      negative. Does the patient have a suspected source of infection? No. Patient's initial      

      sepsis screen is negative. Risk Assessment: Do you want to hurt yourself or someone         

      else? Patient reports no desire to harm self or others. Onset of symptoms was May 31,       

      2020.                                                                                       

18:29 Method Of Arrival: Ambulatory                                                           ca1 

18:29 Acuity: YOSEF 3                                                                           ca1 

                                                                                                  

Historical:                                                                                       

- Allergies:                                                                                      

18:34 No Known Allergies;                                                                     ca1 

- Home Meds:                                                                                      

18:34 amlodipine 10 mg tab 1 tab once daily [Active]; furosemide 40 mg Oral tab 1 tab once    ca1 

      daily [Active]; losartan 100 mg oral tab 1 tab once daily [Active]; metoprolol tartrate     

      25 mg Oral tab 1 tab once daily [Active]; potassium chloride 10 mEq Oral cpER 1 cap         

      once daily [Active];                                                                        

- PMHx:                                                                                           

18:34 Hypertension; CHF;                                                                      ca1 

- PSHx:                                                                                           

18:34 Gastric Bypass;                                                                         ca1 

                                                                                                  

- Immunization history:: Adult Immunizations up to date.                                          

- Social history:: Smoking status: Patient denies any tobacco usage or history of.                

                                                                                                  

                                                                                                  

Screenin:34 Abuse screen: Denies threats or abuse. Nutritional screening: No deficits noted.        ea  

      Tuberculosis screening: No symptoms or risk factors identified. Fall Risk None              

      identified.                                                                                 

                                                                                                  

Assessment:                                                                                       

19:33 General: Appears in no apparent distress. Behavior is appropriate for age. Pain:        ea  

      Complains of pain in left calf and left Achilles. Neuro: Level of Consciousness is          

      awake, alert, obeys commands, Oriented to person, place, time, situation.                   

      Cardiovascular: Patient's skin is warm and dry. Respiratory: Airway is patent               

      Respiratory effort is even, unlabored, Respiratory pattern is regular, symmetrical.         

      Derm: Skin is pink, warm \T\ dry. Musculoskeletal: Circulation, motion, and sensation       

      intact. Reports pain in left calf and left Achilles.                                        

21:00 Reassessment: Patient appears in no apparent distress at this time. Patient and/or      mg2 

      family updated on plan of care and expected duration. Pain level reassessed. Patient is     

      alert, oriented x 3, equal unlabored respirations, skin warm/dry/pink.                      

21:36 Reassessment: Patient and/or family updated on plan of care and expected duration. Pain ea  

      level reassessed. Patient is alert, oriented x 3, equal unlabored respirations, skin        

      warm/dry/pink. Discharge instruction given to patient, verbalized the understanding of      

      instruction. Pt left ED ambulatory tolerating well.                                         

                                                                                                  

Vital Signs:                                                                                      

18:29  / 94; Pulse 68; Resp 15 S; Temp 97.7(TE); Pulse Ox 96% on R/A; Weight 188.24 kg  ca1 

      (R); Height 5 ft. 9 in. (175.26 cm) (R); Pain 5/10;                                         

19:34  / 91; Pulse 60; Resp 16; Pulse Ox 98% ;                                          ea  

21:00  / 79; Pulse 64; Resp 18; Pulse Ox 96% on R/A;                                    mg2 

18:29 Body Mass Index 61.28 (188.24 kg, 175.26 cm)                                            ca1 

                                                                                                  

ED Course:                                                                                        

18:04 Patient arrived in ED.                                                                  ag5 

18:05 Aston Pizano MD is Private Physician.                                                 ag5 

18:32 Triage completed.                                                                       ca1 

18:34 Arm band placed on right wrist.                                                         ca1 

19:29 Kilgore, Yohana, RN is Primary Nurse.                                                    ea  

19:34 Patient has correct armband on for positive identification. Bed in low position. Call   ea  

      light in reach.                                                                             

19:48 Deuce Sousa MD is Attending Physician.                                                    pkl 

20:03 Inserted saline lock: 20 gauge in right antecubital area, using aseptic technique.      ea  

      Blood collected.                                                                            

20:04 Initial lab(s) drawn, by me, sent to lab.                                               ea  

20:41 Extremity Venous [facility] US In Process Unspecified.                                     EDMS

21:32 Aston Pizano MD is Referral Physician.                                                pkl 

21:37 No provider procedures requiring assistance completed. IV discontinued, intact,         ea  

      bleeding controlled, No redness/swelling at site. Pressure dressing applied.                

                                                                                                  

Administered Medications:                                                                         

No medications were administered                                                                  

                                                                                                  

                                                                                                  

Outcome:                                                                                          

21:33 Discharge ordered by MD.                                                                pkl 

21:37 Discharged to home ambulatory.                                                          ea  

21:37 Condition: stable                                                                           

21:37 Discharge instructions given to patient, Instructed on discharge instructions, follow       

      up and referral plans. medication usage, Demonstrated understanding of instructions,        

      follow-up care, medications, Prescriptions given X 1.                                       

21:38 Patient left the ED.                                                                    ea  

                                                                                                  

Signatures:                                                                                       

Dispatcher [employer]                           EDMS                                                 

Deuce Sousa MD MD   pkYohana Pina RN RN ea Gardose, Michele, RN RN mg2 Acob, Cheryl, RN RN   ca1                                                  

Azalea, Maddison                                ag5                                                  

                                                                                                  

Corrections: (The following items were deleted from the chart)                                    

18:35 18:29 Chief complaint: Patient states: L leg pain since x 2 weeks, hurts more when      ca1 

      ambulating and weight bearing. Pain is described as sharp, and intermittent. ca1            

19:35 19:34  / 91; Pulse 16bpm; Resp 60bpm; Pulse Ox 98%; ea                            ea  

                                                                                                  

**************************************************************************************************

## 2020-06-01 NOTE — EDPHYS
Physician Documentation                                                                           

 [facility]                                                                 

Name: Chinyere Sarah III                                                                       

Age: 40 yrs                                                                                       

Sex: Male                                                                                         

: 1980                                                                                   

MRN: [id number]                                                                                   

Arrival Date: 2020                                                                          

Time: 18:04                                                                                       

Account#: [id number]                                                                            

Bed 2                                                                                             

Private MD: Aston Pizano                                                                         

ED Physician Deuce Sousa                                                                             

HPI:                                                                                              

                                                                                             

19:58 This 40 yrs old  Male presents to ER via Ambulatory with complaints of Leg Pain.pkl 

19:58 The patient presents with pain, that is acute. The complaints affect the left calf.     pkl 

      Onset: The symptoms/episode began/occurred 2 week(s) ago. Associated signs and              

      symptoms: The patient has no apparent associated signs or symptoms.                         

                                                                                                  

Historical:                                                                                       

- Allergies:                                                                                      

18:34 No Known Allergies;                                                                     ca1 

- Home Meds:                                                                                      

18:34 amlodipine 10 mg tab 1 tab once daily [Active]; furosemide 40 mg Oral tab 1 tab once    ca1 

      daily [Active]; losartan 100 mg oral tab 1 tab once daily [Active]; metoprolol tartrate     

      25 mg Oral tab 1 tab once daily [Active]; potassium chloride 10 mEq Oral cpER 1 cap         

      once daily [Active];                                                                        

- PMHx:                                                                                           

18:34 Hypertension; CHF;                                                                      ca1 

- PSHx:                                                                                           

18:34 Gastric Bypass;                                                                         ca1 

                                                                                                  

- Immunization history:: Adult Immunizations up to date.                                          

- Social history:: Smoking status: Patient denies any tobacco usage or history of.                

                                                                                                  

                                                                                                  

ROS:                                                                                              

19:58 Eyes: Negative for injury, pain, redness, and discharge, ENT: Negative for injury,      pkl 

      pain, and discharge, Neck: Negative for injury, pain, and swelling, Cardiovascular:         

      Negative for chest pain, palpitations, and edema, Respiratory: Negative for shortness       

      of breath, cough, wheezing, and pleuritic chest pain, Abdomen/GI: Negative for              

      abdominal pain, nausea, vomiting, diarrhea, and constipation, Back: Negative for injury     

      and pain, : Negative for injury, bleeding, discharge, and swelling.                       

19:58 MS/extremity: Positive for pain, of the left calf.                                          

19:58 Skin: Negative for rash.                                                                    

19:58 Neuro: Negative for altered mental status.                                                  

                                                                                                  

Exam:                                                                                             

19:58 Head/Face:  Normocephalic, atraumatic. Eyes:  Pupils equal round and reactive to light, pkl 

      extra-ocular motions intact.  Lids and lashes normal.  Conjunctiva and sclera are           

      non-icteric and not injected.  Cornea within normal limits.  Periorbital areas with no      

      swelling, redness, or edema. ENT:  Nares patent. No nasal discharge, no septal              

      abnormalities noted.  Tympanic membranes are normal and external auditory canals are        

      clear.  Oropharynx with no redness, swelling, or masses, exudates, or evidence of           

      obstruction, uvula midline.  Mucous membranes moist. Neck:  Trachea midline, no             

      thyromegaly or masses palpated, and no cervical lymphadenopathy.  Supple, full range of     

      motion without nuchal rigidity, or vertebral point tenderness.  No Meningismus.             

      Chest/axilla:  Normal chest wall appearance and motion.  Nontender with no deformity.       

      No lesions are appreciated. Cardiovascular:  Regular rate and rhythm with a normal S1       

      and S2.  No gallops, murmurs, or rubs.  Normal PMI, no JVD.  No pulse deficits.             

      Respiratory:  Lungs have equal breath sounds bilaterally, clear to auscultation and         

      percussion.  No rales, rhonchi or wheezes noted.  No increased work of breathing, no        

      retractions or nasal flaring. Abdomen/GI:  Soft, non-tender, with normal bowel sounds.      

      No distension or tympany.  No guarding or rebound.  No evidence of tenderness               

      throughout. Back:  No spinal tenderness.  No costovertebral tenderness.  Full range of      

      motion. Skin:  Warm, dry with normal turgor.  Normal color with no rashes, no lesions,      

      and no evidence of cellulitis. Neuro:  Awake and alert, GCS 15, oriented to person,         

      place, time, and situation.  Cranial nerves II-XII grossly intact.  Motor strength 5/5      

      in all extremities.  Sensory grossly intact.  Cerebellar exam normal.  Normal gait.         

19:58 Musculoskeletal/extremity: Extremities: grossly normal except: pain, tenderness.            

                                                                                                  

Vital Signs:                                                                                      

18:29  / 94; Pulse 68; Resp 15 S; Temp 97.7(TE); Pulse Ox 96% on R/A; Weight 188.24 kg  ca1 

      (R); Height 5 ft. 9 in. (175.26 cm) (R); Pain 5/10;                                         

19:34  / 91; Pulse 60; Resp 16; Pulse Ox 98% ;                                          ea  

21:00  / 79; Pulse 64; Resp 18; Pulse Ox 96% on R/A;                                    mg2 

18:29 Body Mass Index 61.28 (188.24 kg, 175.26 cm)                                            ca1 

                                                                                                  

MDM:                                                                                              

19:48 Patient medically screened.                                                             pkl 

21:30 Data reviewed: vital signs, nurses notes. ED course: Discussed lab. and US results with pk 

      patient. Advised to follow up with PCP in 2 to 3 days. Patient understood instructions.     

                                                                                                  

                                                                                             

19:48 Order name: Basic Metabolic Panel; Complete Time: 21:                                   

                                                                                             

19:48 Order name: CBC with Diff; Complete Time: :                                           

                                                                                             

19:48 Order name: LFT's; Complete Time: :                                                   

                                                                                             

19:48 Order name: Magnesium; Complete Time: :                                               

                                                                                             

19:48 Order name: NT PRO-BNP; Complete Time: :                                              

                                                                                             

19:48 Order name: PT-INR; Complete Time: :                                                  

                                                                                             

19:48 Order name: Troponin (emerg Dept Use Only); Complete Time: :                          

                                                                                             

19:48 Order name: Cardiac monitoring; Complete Time: 20:                                      

                                                                                             

19:48 Order name: EKG - Nurse/Tech; Complete Time: 20:                                        

                                                                                             

19:48 Order name: IV Saline Lock; Complete Time: 20:                                          

                                                                                             

19:48 Order name: Labs collected and sent; Complete Time: 20:                                 

                                                                                             

19:48 Order name: O2 Per Protocol; Complete Time: 20:                                         

                                                                                             

19:48 Order name: DD; Complete Time: 21:                                                      

                                                                                             

19:48 Order name: Extremity Venous Uni [facility]  

                                                                                             

19:48 Order name: O2 Sat Monitoring; Complete Time: 20:03                                       

                                                                                                  

Administered Medications:                                                                         

No medications were administered                                                                  

                                                                                                  

                                                                                                  

Disposition:                                                                                      

20 21:33 Discharged to Home. Impression: Right calf pain.                                   

- Condition is Stable.                                                                            

                                                                                                  

- Prescriptions for Diclofenac Sodium 75 mg Oral Tablet Sustained Release - take 1                

  tablet by ORAL route 2 times per day; 30 tablet.                                                

- Medication Reconciliation Form, Thank You Letter, Antibiotic Education, Prescription            

  Opioid Use, Work release form form.                                                             

- Follow up: Aston Pizano MD; When: 2 - 3 days; Reason: Re-evaluation by your                   

  physician.                                                                                      

- Problem is new.                                                                                 

- Symptoms have improved.                                                                         

                                                                                                  

                                                                                                  

                                                                                                  

Signatures:                                                                                       

Dispatcher [employer]                           EDMS                                                 

Deuce Sousa MD MD pkl Antunez, Yohana, RN                      RN   ea                                                   

Acob, Christy RN                        RN   ca1                                                  

                                                                                                  

Corrections: (The following items were deleted from the chart)                                    

21:38 21:33 2020 21:33 Discharged to Home. Impression: Right calf pain. Condition is    ea  

      Stable. Forms are Work release form, Medication Reconciliation Form, Thank You Letter,      

      Antibiotic Education, Prescription Opioid Use. Follow up: Aston Pizano; When: 2 - 3         

      days; Reason: Re-evaluation by your physician. Problem is new. Symptoms have improved.      

      pkl                                                                                         

                                                                                                  

**************************************************************************************************

## 2020-12-08 ENCOUNTER — HOSPITAL ENCOUNTER (INPATIENT)
Dept: HOSPITAL 97 - ER | Age: 40
LOS: 2 days | Discharge: HOME | DRG: 308 | End: 2020-12-10
Attending: FAMILY MEDICINE | Admitting: FAMILY MEDICINE
Payer: SELF-PAY

## 2020-12-08 VITALS — BODY MASS INDEX: 59.1 KG/M2

## 2020-12-08 DIAGNOSIS — Z20.828: ICD-10-CM

## 2020-12-08 DIAGNOSIS — I48.91: Primary | ICD-10-CM

## 2020-12-08 DIAGNOSIS — R73.03: ICD-10-CM

## 2020-12-08 DIAGNOSIS — Z79.01: ICD-10-CM

## 2020-12-08 DIAGNOSIS — Z98.84: ICD-10-CM

## 2020-12-08 DIAGNOSIS — I50.33: ICD-10-CM

## 2020-12-08 DIAGNOSIS — E66.01: ICD-10-CM

## 2020-12-08 DIAGNOSIS — I11.0: ICD-10-CM

## 2020-12-08 DIAGNOSIS — G47.33: ICD-10-CM

## 2020-12-08 DIAGNOSIS — Z79.899: ICD-10-CM

## 2020-12-08 LAB
ALBUMIN SERPL BCP-MCNC: 3.7 G/DL (ref 3.4–5)
ALP SERPL-CCNC: 77 U/L (ref 45–117)
ALT SERPL W P-5'-P-CCNC: 47 U/L (ref 12–78)
AST SERPL W P-5'-P-CCNC: 25 U/L (ref 15–37)
BUN BLD-MCNC: 20 MG/DL (ref 7–18)
CKMB CREATINE KINASE MB: 1.1 NG/ML (ref 0.3–3.6)
CKMB CREATINE KINASE MB: < 1 NG/ML (ref 0.3–3.6)
CKMB CREATINE KINASE MB: < 1 NG/ML (ref 0.3–3.6)
GLUCOSE SERPLBLD-MCNC: 110 MG/DL (ref 74–106)
HCT VFR BLD CALC: 43 % (ref 39.6–49)
INR BLD: 1.25
LIPASE SERPL-CCNC: 85 U/L (ref 73–393)
LYMPHOCYTES # SPEC AUTO: 1.3 K/UL (ref 0.7–4.9)
MAGNESIUM SERPL-MCNC: 2.4 MG/DL (ref 1.8–2.4)
NT-PROBNP SERPL-MCNC: 1482 PG/ML (ref ?–125)
PMV BLD: 9.3 FL (ref 7.6–11.3)
POTASSIUM SERPL-SCNC: 4.1 MMOL/L (ref 3.5–5.1)
RBC # BLD: 4.88 M/UL (ref 4.33–5.43)
TROPONIN (EMERG DEPT USE ONLY): < 0.02 NG/ML (ref 0–0.04)
TROPONIN I: < 0.02 NG/ML (ref 0–0.04)
TROPONIN I: < 0.02 NG/ML (ref 0–0.04)

## 2020-12-08 PROCEDURE — 85610 PROTHROMBIN TIME: CPT

## 2020-12-08 PROCEDURE — 80048 BASIC METABOLIC PNL TOTAL CA: CPT

## 2020-12-08 PROCEDURE — 83036 HEMOGLOBIN GLYCOSYLATED A1C: CPT

## 2020-12-08 PROCEDURE — 80076 HEPATIC FUNCTION PANEL: CPT

## 2020-12-08 PROCEDURE — 99285 EMERGENCY DEPT VISIT HI MDM: CPT

## 2020-12-08 PROCEDURE — 84484 ASSAY OF TROPONIN QUANT: CPT

## 2020-12-08 PROCEDURE — 93005 ELECTROCARDIOGRAM TRACING: CPT

## 2020-12-08 PROCEDURE — 82947 ASSAY GLUCOSE BLOOD QUANT: CPT

## 2020-12-08 PROCEDURE — 83690 ASSAY OF LIPASE: CPT

## 2020-12-08 PROCEDURE — 80061 LIPID PANEL: CPT

## 2020-12-08 PROCEDURE — 84439 ASSAY OF FREE THYROXINE: CPT

## 2020-12-08 PROCEDURE — 83880 ASSAY OF NATRIURETIC PEPTIDE: CPT

## 2020-12-08 PROCEDURE — 93306 TTE W/DOPPLER COMPLETE: CPT

## 2020-12-08 PROCEDURE — 96375 TX/PRO/DX INJ NEW DRUG ADDON: CPT

## 2020-12-08 PROCEDURE — 82553 CREATINE MB FRACTION: CPT

## 2020-12-08 PROCEDURE — 82550 ASSAY OF CK (CPK): CPT

## 2020-12-08 PROCEDURE — 71275 CT ANGIOGRAPHY CHEST: CPT

## 2020-12-08 PROCEDURE — 85730 THROMBOPLASTIN TIME PARTIAL: CPT

## 2020-12-08 PROCEDURE — 87040 BLOOD CULTURE FOR BACTERIA: CPT

## 2020-12-08 PROCEDURE — 36415 COLL VENOUS BLD VENIPUNCTURE: CPT

## 2020-12-08 PROCEDURE — 85025 COMPLETE CBC W/AUTO DIFF WBC: CPT

## 2020-12-08 PROCEDURE — 84443 ASSAY THYROID STIM HORMONE: CPT

## 2020-12-08 PROCEDURE — 96374 THER/PROPH/DIAG INJ IV PUSH: CPT

## 2020-12-08 PROCEDURE — 83735 ASSAY OF MAGNESIUM: CPT

## 2020-12-08 RX ADMIN — METOPROLOL TARTRATE SCH MG: 50 TABLET, FILM COATED ORAL at 21:00

## 2020-12-08 RX ADMIN — Medication SCH ML: at 21:04

## 2020-12-08 RX ADMIN — METOPROLOL TARTRATE SCH MG: 50 TABLET, FILM COATED ORAL at 13:21

## 2020-12-08 RX ADMIN — FUROSEMIDE SCH MG: 10 INJECTION, SOLUTION INTRAVENOUS at 16:17

## 2020-12-08 RX ADMIN — FAMOTIDINE SCH MG: 20 TABLET, FILM COATED ORAL at 21:00

## 2020-12-08 NOTE — EDPHYS
Physician Documentation                                                                           

 [facility]                                                                 

Name: Chinyere Sarah III                                                                       

Age: 40 yrs                                                                                       

Sex: Male                                                                                         

: 1980                                                                                   

MRN: [id number]                                                                                   

Arrival Date: 2020                                                                          

Time: 08:24                                                                                       

Account#: [id number]                                                                            

Bed 7                                                                                             

Private MD: Aston Pizano                                                                         

ED Physician Edwina Taylor                                                                    

HPI:                                                                                              

                                                                                             

08:49 This 40 yrs old  Male presents to ER via Ambulatory with complaints of          ma2 

      Shortness Of Breath.                                                                        

08:49 The patient has shortness of breath at rest. Onset: The symptoms/episode began/occurred ma2 

      gradually, 2 week(s) ago. Duration: The symptoms are continuous. Associated signs and       

      symptoms: Pertinent negatives: productive cough, dizziness, hemoptysis, loss of             

      consciousness, numbness in extremities. Severity of symptoms: At their worst the            

      symptoms were mild in the emergency department the symptoms are unchanged. The patient      

      has not experienced similar symptoms in the past.                                           

                                                                                                  

Historical:                                                                                       

- Allergies:                                                                                      

08:41 No Known Allergies;                                                                     iw  

- Home Meds:                                                                                      

08:41 amlodipine 10 mg tab 1 tab once daily [Active]; furosemide 40 mg Oral tab 1 tab once    iw  

      daily [Active]; losartan 100 mg Oral tab 1 tab once daily [Active]; metoprolol tartrate     

      25 mg Oral tab 1 tab once daily [Active]; potassium chloride 10 mEq Oral cpER 1 cap         

      once daily [Active];                                                                        

- PMHx:                                                                                           

08:41 CHF; Hypertension;                                                                      iw  

- PSHx:                                                                                           

08:41 Gastric Bypass;                                                                         iw  

                                                                                                  

- Immunization history:: Adult Immunizations not up to date.                                      

- Social history:: Smoking status: Patient denies any tobacco usage or history of.                

  Patient/guardian denies using alcohol, street drugs, The patient lives with family.             

- Family history:: not pertinent.                                                                 

                                                                                                  

                                                                                                  

ROS:                                                                                              

08:49 Constitutional: Negative for fever, chills, and weight loss.                            ma2 

08:49 All other systems are negative.                                                             

                                                                                                  

Exam:                                                                                             

08:49 Constitutional:  This is a well developed, well nourished patient who is awake, alert,  ma2 

      and in no acute distress. Head/Face:  Normocephalic, atraumatic. Eyes:  Pupils equal        

      round and reactive to light, extra-ocular motions intact.  Lids and lashes normal.          

      Conjunctiva and sclera are non-icteric and not injected.  Cornea within normal limits.      

      Periorbital areas with no swelling, redness, or edema. ENT:  Nares patent. No nasal         

      discharge, no septal abnormalities noted.  Tympanic membranes are normal and external       

      auditory canals are clear.  Oropharynx with no redness, swelling, or masses, exudates,      

      or evidence of obstruction, uvula midline.  Mucous membranes moist. Neck:  Trachea          

      midline, no thyromegaly or masses palpated, and no cervical lymphadenopathy.  Supple,       

      full range of motion without nuchal rigidity, or vertebral point tenderness.  No            

      Meningismus. Chest/axilla:  Normal chest wall appearance and motion.  Nontender with no     

      deformity.  No lesions are appreciated. Cardiovascular:  tachycardia irregularly            

      irregular  rhythm with a normal S1 and S2.  No gallops, murmurs, or rubs.  Normal PMI,      

      no JVD.  No pulse deficits. Respiratory:  Lungs have equal breath sounds bilaterally,       

      clear to auscultation and percussion.  No rales, rhonchi or wheezes noted.  No              

      increased work of breathing, no retractions or nasal flaring. Abdomen/GI:  Soft,            

      non-tender, with normal bowel sounds.  No distension or tympany.  No guarding or            

      rebound.  No evidence of tenderness throughout. Back:  No spinal tenderness.  No            

      costovertebral tenderness.  Full range of motion. MS/ Extremity:  Pulses equal, no          

      cyanosis.  Neurovascular intact.  Full, normal range of motion. Neuro:  Awake and           

      alert, GCS 15, oriented to person, place, time, and situation.  Cranial nerves II-XII       

      grossly intact.  Motor strength 5/5 in all extremities.  Sensory grossly intact.            

      Cerebellar exam normal.  Normal gait.                                                       

                                                                                                  

Vital Signs:                                                                                      

08:39  / 91; Pulse 174; Resp 20 S; Pulse Ox 98% on R/A; Weight 176.9 kg; Height 5 ft. 8 iw  

      in. (172.72 cm);                                                                            

08:45  / 99; Pulse 169; Resp 21; Pulse Ox 97% ;                                         jl7 

08:48 Temp 98.7(O);                                                                           tw2 

08:55 BP 99 / 64; Pulse 146;                                                                  jl7 

09:00 BP 97 / 72; Pulse 140;                                                                  jl7 

09:04  / 73; Pulse 134; Resp 15; Pulse Ox 97% on R/A;                                   jl7 

09:30  / 71; Pulse 133; Resp 21; Pulse Ox 97% ;                                         jl7 

10:20  / 74; Pulse 144; Resp 25; Pulse Ox 95% on R/A;                                   tw2 

10:49  / 75; Pulse 105; Resp 19; Pulse Ox 97% ;                                         jl7 

12:45  / 85; Pulse 122; Resp 19; Pulse Ox 96% ;                                         jl7 

08:39 Body Mass Index 59.30 (176.90 kg, 172.72 cm)                                              

                                                                                                  

MDM:                                                                                              

08:30 Patient medically screened.                                                             ma2 

08:49 Differential diagnosis: Bronchitis CHF exacerbation, pneumonia, reactive airway disease.ma2 

10:34 Data reviewed: vital signs, nurses notes. Counseling: I had a detailed discussion with  ma2 

      the patient and/or guardian regarding: the historical points, exam findings, and any        

      diagnostic results supporting the discharge/admit diagnosis, the presence of at least       

      one elevated blood pressure reading (>120/80) during this emergency department visit,       

      the need for outpatient follow up. Response to treatment: the patient's symptoms have       

      markedly improved after treatment, had afiv w rvr normotensive, symptoms started 2          

      weeks ago and been constant.. given metop x 2 with no response, given diltiazem that        

      resulted in rate control to 11 bpm at this time and been normotensive .                     

                                                                                                  

                                                                                             

08:37 Order name: Basic Metabolic Panel                                                         

                                                                                             

08:37 Order name: CBC with Diff                                                                 

                                                                                             

08:37 Order name: LFT's                                                                         

                                                                                             

08:37 Order name: Magnesium                                                                     

                                                                                             

08:37 Order name: NT PRO-BNP                                                                    

                                                                                             

08:37 Order name: PT-INR                                                                        

                                                                                             

08:37 Order name: Troponin (emerg Dept Use Only)                                                

                                                                                             

08:45 Order name: Blood Culture Adult (2)                                                     [facility] 

                                                                                             

08:45 Order name: Ckmb                                                                        [facility] 

                                                                                             

08:45 Order name: CPK                                                                         [facility] 

                                                                                             

08:45 Order name: Lipase                                                                      [facility] 

                                                                                             

08:45 Order name: Ptt, Activated                                                              [facility] 

                                                                                             

08:48 Order name: COVID-19                                                                    [facility] 

                                                                                             

09:46 Order name: Blood Culture                                                               [facility]

                                                                                             

09:46 Order name: CBC with Automated Diff; Complete Time: 09:58                               [facility]

                                                                                             

09:46 Order name: CBC with Automated Diff; Complete Time: 09:58                               [facility]

                                                                                             

09:47 Order name: Protime (+INR); Complete Time: 09:58                                        [facility]

1208                                                                                             

09:47 Order name: PTT, Activated Partial Thromb; Complete Time: 09:58                         [facility]

08                                                                                             

09:47 Order name: Protime (+INR); Complete Time: 09:58                                        [facility]

                                                                                             

09:47 Order name: PTT, Activated Partial Thromb; Complete Time: 09:58                         [facility]

                                                                                             

09:57 Order name: Basic Metabolic Panel; Complete Time: 09:58                                 [facility]

                                                                                             

09:58 Order name: Liver (Hepatic) Function; Complete Time: 09:58                              [facility]

                                                                                             

09:58 Order name: Creatine Phosphokinase; Complete Time: 09:58                                [facility]

                                                                                             

09:58 Order name: CKMB Creatine Kinase MB; Complete Time: 09:58                               [facility]MS

                                                                                             

09:58 Order name: Troponin (Emerg Dept Use Only); Complete Time: 09:58                        [facility]

                                                                                             

09:58 Order name: NT PRO-BNP; Complete Time: 09:58                                            [facility]

                                                                                             

09:58 Order name: Magnesium; Complete Time: 09:58                                             [facility]

                                                                                             

09:58 Order name: Lipase                                                                      [facility]

                                                                                             

09:58 Order name: Basic Metabolic Panel                                                       [facility]

                                                                                             

09:58 Order name: Liver (Hepatic) Function                                                    [facility]

                                                                                             

08:37 Order name: XRAY Chest (1 view)                                                         iw  

                                                                                             

08:37 Order name: EKG; Complete Time: 12:53                                                   iw  

                                                                                             

08:37 Order name: Cardiac monitoring; Complete Time: 08:47                                    iw  

                                                                                             

08:37 Order name: EKG - Nurse/Tech; Complete Time: 08:47                                      iw  

                                                                                             

08:37 Order name: IV Saline Lock; Complete Time: 08:47                                        iw  

                                                                                             

08:37 Order name: Labs collected and sent; Complete Time: 08:47                               iw  

                                                                                             

08:37 Order name: O2 Per Protocol; Complete Time: 08:48                                       iw  

                                                                                             

08:37 Order name: O2 Sat Monitoring; Complete Time: 08:48                                     iw  

                                                                                             

09:58 Order name: Creatine Phosphokinase                                                      [facility]

                                                                                             

09:58 Order name: CKMB Creatine Kinase MB                                                     [facility]

                                                                                             

09:58 Order name: Troponin (Emerg Dept Use Only)                                              [facility]

                                                                                             

09:58 Order name: NT PRO-BNP                                                                  [facility]

                                                                                             

09:58 Order name: Magnesium                                                                   [facility]

                                                                                             

09:58 Order name: Lipase                                                                      [facility]

                                                                                             

11:49 Order name: SARS-COV-2 RT PCR                                                           [facility]

                                                                                             

12:27 Order name: CT                                                                          [facility]

                                                                                                  

Administered Medications:                                                                         

08:43 Drug: Metoprolol 5 mg Route: IVP; Site: right antecubital;                              jl7 

08:48 Drug: Metoprolol 5 mg Route: IVP; Site: right antecubital;                              jl7 

08:50 Drug: Lasix 40 mg Route: IVP; Site: right antecubital;                                  jl7 

09:34 Follow up: Response: No adverse reaction                                                jl7 

12:51 Follow up: Urine output 450 ml                                                          jl7 

08:52 Drug: SOLU-Medrol 125 mg Route: IVP; Site: right antecubital;                           jl7 

09:35 Follow up: Response: No adverse reaction                                                jl7 

08:55 Drug: Aspirin Chewable Tablet 324 mg Route: PO;                                         jl7 

09:34 Follow up: Response: No adverse reaction                                                jl7 

09:04 Drug: Metoprolol 5 mg Route: IVP; Site: right antecubital;                              jl7 

09:30 Follow up: Response: No adverse reaction                                                jl7 

09:45 Drug: Diltiazem 25 mg Route: IVP; Site: right antecubital;                              jl7 

12:45 Follow up: Response: No adverse reaction                                                jl7 

10:48 Not Given (Physician Discretion): Diltiazem 30 mg IVP once; Over 2 Minutes              jl7 

                                                                                                  

                                                                                                  

Disposition:                                                                                      

20 10:37 Hospitalization ordered by Reid Galindo for Inpatient Admission. Preliminary     

  diagnosis is Atrial fibrillation and flutter - with RVR.                                        

- Bed requested for Telemetry/MedSurg (Inpatient).                                                

- Status is Inpatient Admission.                                                              jl7 

- Condition is Stable.                                                                            

- Problem is new.                                                                                 

- Symptoms are unchanged.                                                                         

                                                                                                  

                                                                                                  

                                                                                                  

Signatures:                                                                                       

Dispatcher [employer]                           EDMS                                                 

Portia Painter RN RN dw Williams, Irene, RN RN iw Leal, Jahala, RN RN   jl7                                                  

Edwina Taylor MD MD ma2                                                  

                                                                                                  

Corrections: (The following items were deleted from the chart)                                    

11:48 10:37 Hospitalization Ordered by Reid Galindo DO for Inpatient Admission. Preliminary  kate  

      diagnosis is Atrial fibrillation and flutter - with RVR. Bed requested for                  

      Telemetry/MedSurg (Inpatient). Status is Inpatient Admission. Condition is Stable.          

      Problem is new. Symptoms are unchanged. ma2                                                 

12:52 11:48 2020 10:37 Hospitalization Ordered by Reid Galindo DO for Inpatient        jl7 

      Admission. Preliminary diagnosis is Atrial fibrillation and flutter - with RVR. Bed         

      requested for Telemetry/MedSurg (Inpatient). Status is Inpatient Admission. Condition       

      is Stable. Problem is new. Symptoms are unchanged. dw                                       

                                                                                                  

**************************************************************************************************

## 2020-12-08 NOTE — RAD REPORT
EXAM DESCRIPTION:  CT - Chest For Pe Angio - 12/8/2020 11:57 am

 

CLINICAL HISTORY:   sob

 

COMPARISON:  None.

 

TECHNIQUE:  Dynamically enhanced axial 3 mm thick images of the chest were obtained during administra
tion of <100> mL Isovue 370 IV contrast. Coronal and oblique reconstruction images were generated and
 reviewed. Exam utilizes a protocol for optimal evaluation of pulmonary arterial tree.

 

Maximum intensity projections 3D imaging was utilized

 

All CT scans are performed using dose optimization technique as appropriate and may include automated
 exposure control or mA/KV adjustment according to patient size.

 

FINDINGS:  A pulmonary embolus is not seen.

 

A thoracic aortic aneurysm is not noted. The heart is enlarged

 

A pleural effusion is not seen. A pericardial effusion is not seen.

 

Mild to moderate diffuse ground-glass opacities within the lungs bilaterally

 

IMPRESSION:  Negative for a pulmonary embolism.

 

Mild to moderate diffuse ground-glass opacities within the lungs bilaterally may indicate pulmonary e
[place], pneumonitis or pneumonia

## 2020-12-08 NOTE — P.HP
Certification for Inpatient


Patient admitted to: Observation


With expected LOS: <2 Midnights


Patient will require the following post-hospital care: None


Practitioner: I am a practitioner with admitting privileges, knowledge of 

patient current condition, hospital course, and medical plan of care.


Services: Services provided to patient in accordance with Admission requirements

found in Title 42 Section 412.3 of the Code of Federal Regulations





Patient History


Date of Service: 12/08/20


Primary Care Provider: Dr. Horvath; Cardiology-Dr. Mir


Reason for admission: Dyspnea, palpitations


History of Present Illness: 





40-year-old  male with history of diastolic CHF, hypertension, 

obstructive sleep apnea.





Patient presented with increasing shortness of breath over the past several 

weeks.  He also reported palpitations during this time.  Over the past week he 

has noted increasing shortness of breath with exertion.  He denies any fever, 

chills, chest pain. He also denies any nausea, vomiting and abdominal pain. He 

came to the ER for further evaluation.





In the ER patient was evaluated.  Patient found to be in atrial fibrillation 

with RVR.  Rate around 160-170.  Blood pressure 130/91.  On lab white count 8.3,

hemoglobin 14 periods platelet count 240 for 8. Sodium 139, potassium 4.1.  BUN 

of 20, creatinine 0.8 with a GFR greater than 90.  Glucose 110.  Troponin 

unremarkable.  BNP 1480. Total bilirubin 1.8.  Patient was given several doses 

of Lopressor IV, Lasix IV and Solu-Medrol.  Patient heart rate better cont

rolled.  Patient admitted for further evaluation and treatment.





When I saw the patient ER, patient appeared comfortable.  Heart rate still 

around 100-120.  Patient did not look in any severe distress. Patient has 

reported increase fluid intake over the past several days.  Some edema noted to 

the lower extremities.


Allergies





No Known Allergies Allergy (Unverified 10/01/18 09:40)


   





Home medications list reviewed: Yes


Home Medications: 








Amlodipine [Norvasc*] 10 mg PO DAILY #30 tab 10/02/18 


Furosemide [Lasix] 20 mg PO DAILY #30 tablet 10/02/18 


Losartan Potassium [Cozaar*] 100 mg PO DAILY #60 tablet 10/02/18 


Potassium Chloride 10 meq PO DAILY #30 tablet.er 10/02/18 


Metoprolol Tartrate 12.5 mg PO BID #30 tablet 10/03/18 








- Past Medical/Surgical History


Diabetic: No


-: Hypertension


-: Diastolic CHF


-: Obstructive sleep apnea


-: Morbid obesity


-: Gastric Bypass, Lap Band


Psychosocial/ Personal History: Patient is .  He works as a fork lift 







- Family History


  ** Father


-: Heart disease, Other (see notes) (Atrial fibrillation)





  ** Mother


-: Heart disease, Other (see notes) (Atrial fibrillation)





- Social History


Smoking Status: Never smoker


Alcohol use: No


CD- Drugs: No


Caffeine use: Yes


Place of Residence: Home





Review of Systems


General: As per HPI


Eyes: Unremarkable


ENT: Unremarkable


Respiratory: Shortness of Breath, SOB with Excertion, As per HPI


Cardiovascular: Palpitations, As per HPI


Gastrointestinal: Unremarkable


Genitourinary: Unremarkable


Musculoskeletal: Unremarkable


Integumentary: Unremarkable


Neurological: Unremarkable


Lymphatics: Unremarkable





Physical Examination





- Physical Exam


General: Alert, In no apparent distress, Oriented x3, Cooperative


HEENT: Atraumatic, Normocephalic, Mucous membr. moist/pink


Neck: Supple


Respiratory: Crackles/rales (Crackles to the bases bilateral)


Cardiovascular: Irregular heart rate/rhythm (AFib with rate around 100-120)


Gastrointestinal: Normal bowel sounds, No ascites, No tenderness, No masses, No 

rebound, No guarding


Musculoskeletal: No erythema, No tenderness, No warmth


Integumentary: No tenderness/swelling, No erythema, No warmth, No cyanosis


Neurological: Normal speech, Normal strength at 5/5 x4 extr, Normal tone, Normal

 affect





- Studies


Laboratory Data (last 24 hrs)





12/08/20 08:35: PT 14.7 H, INR 1.25, APTT 29.9


12/08/20 08:35: WBC 8.3, Hgb 14.1, Hct 43.0, Plt Count 248


12/08/20 08:35: Sodium 139, Potassium 4.1, BUN 20 H, Creatinine 0.82, Glucose 

110 H, Magnesium 2.4, Total Bilirubin 1.8 H, AST 25, ALT 47, Alkaline 

Phosphatase 77, Lipase 85








Assessment and Plan





- Plan





Impression:


Dyspnea with exertion, palpitations secondary to atrial fibrillation with RVR, 

new


Acute on chronic diastolic CHF


Hypertension


Obstructive sleep apnea


Morbid obesity





Plan:


Dyspnea with exertion, palpitations secondary to atrial fibrillation with RVR, 

new:  Patient will be admitted for further evaluation and treatment.  Will start

 metoprolol but increase his normal dose.  Schmidt also start on Lovenox at 1 

milligram/kilogram subcu twice daily.  Will check thyroid panel.  Obtain 

echocardiogram.  Cardiology consulted.  Will obtain CT scan of the chest to rule

 out pulmonary embolism.  Await further recommendations from cardiology.  If 

patient continues to remain in AFib will need to further address with 

cardiology.  Anticipate improvement over the next 24 hr.


Acute on chronic diastolic CHF:  Patient given Lasix in the emergency room.  

Continue with IV Lasix.  Teach on 1500 cc per day fluid restriction.  Obtain 

echocardiogram to further evaluate.


Hypertension:  Hold Norvasc and losartan at this time.  Increase metoprolol.  

May need to restart losartan at discharge.


Obstructive sleep apnea:  Patient uses CPAP at night.  Will allow patient to use

 his own.


Morbid obesity:  Address lifestyle modification education.


Discharge Plan: Home


Plan to discharge in: 24 Hours





- Advance Directives


Does patient have a Living Will: No


Does patient have a Durable POA for Healthcare: No





- Code Status/Comfort Care


Code Status Assessed: Yes (Patient full code)


Time Spent Managing Pts Care (In Minutes): 55

## 2020-12-08 NOTE — ER
Nurse's Notes                                                                                     

 [facility]                                                                 

Name: Chinyere Sarah III                                                                       

Age: 40 yrs                                                                                       

Sex: Male                                                                                         

: 1980                                                                                   

MRN: [id number]                                                                                   

Arrival Date: 2020                                                                          

Time: 08:24                                                                                       

Account#: [id number]                                                                            

Bed 7                                                                                             

Private MD: Aston Pizano                                                                         

Diagnosis: Atrial fibrillation and flutter-with RVR                                               

                                                                                                  

Presentation:                                                                                     

                                                                                             

08:39 Chief complaint: Patient states: SOB X 2 weeks , denies cough fever or chest pain, HR   iw  

      in 170's, Afib RVR on monitor , no hx of Afib. Ebola Screen: Patient negative for fever     

      greater than or equal to 101.5 degrees Fahrenheit, and additional compatible Ebola          

      Virus Disease symptoms Patient denies exposure to infectious person. Patient denies         

      travel to an Ebola-affected area in the 21 days before illness onset. No symptoms or        

      risks identified at this time. Initial Sepsis Screen: Does the patient meet any 2           

      criteria? No. Patient's initial sepsis screen is negative. Does the patient have a          

      suspected source of infection? No. Patient's initial sepsis screen is negative. Risk        

      Assessment: Do you want to hurt yourself or someone else? Patient reports no desire to      

      harm self or others.                                                                        

08:39 Method Of Arrival: Ambulatory                                                           iw  

08:39 Acuity: YOSEF 2                                                                           iw  

                                                                                                  

Historical:                                                                                       

- Allergies:                                                                                      

08:41 No Known Allergies;                                                                     iw  

- Home Meds:                                                                                      

08:41 amlodipine 10 mg tab 1 tab once daily [Active]; furosemide 40 mg Oral tab 1 tab once    iw  

      daily [Active]; losartan 100 mg Oral tab 1 tab once daily [Active]; metoprolol tartrate     

      25 mg Oral tab 1 tab once daily [Active]; potassium chloride 10 mEq Oral cpER 1 cap         

      once daily [Active];                                                                        

- PMHx:                                                                                           

08:41 CHF; Hypertension;                                                                      iw  

- PSHx:                                                                                           

08:41 Gastric Bypass;                                                                         iw  

                                                                                                  

- Immunization history:: Adult Immunizations not up to date.                                      

- Social history:: Smoking status: Patient denies any tobacco usage or history of.                

  Patient/guardian denies using alcohol, street drugs, The patient lives with family.             

- Family history:: not pertinent.                                                                 

                                                                                                  

                                                                                                  

Screenin:23 Abuse screen: Denies threats or abuse. Denies injuries from another. Nutritional        jl7 

      screening: No deficits noted. Tuberculosis screening: No symptoms or risk factors           

      identified. Fall Risk IV access (20 points). Total Jacques Fall Scale indicates No Risk       

      (0-24 pts).                                                                                 

                                                                                                  

Assessment:                                                                                       

08:30 General: Appears in no apparent distress. uncomfortable, obese, Behavior is             jl7 

      cooperative, appropriate for age, anxious. Pain: Denies pain. Neuro: Level of               

      Consciousness is awake, alert, obeys commands, Oriented to person, place, time,             

      situation. Cardiovascular: Reports chest pain, diaphoresis, palpitations, shortness of      

      breath, Heart tones present Rhythm is atrial fibrillation with rapid ventricular            

      response. Respiratory: Airway is patent Respiratory effort is even, labored,                

      Respiratory pattern is symmetrical, tachypnea. Derm: Skin is diaphoretic, Skin is           

      normal, Skin temperature is warm.                                                           

                                                                                                  

Vital Signs:                                                                                      

08:39  / 91; Pulse 174; Resp 20 S; Pulse Ox 98% on R/A; Weight 176.9 kg; Height 5 ft. 8 iw  

      in. (172.72 cm);                                                                            

08:45  / 99; Pulse 169; Resp 21; Pulse Ox 97% ;                                         jl7 

08:48 Temp 98.7(O);                                                                           tw2 

08:55 BP 99 / 64; Pulse 146;                                                                  jl7 

09:00 BP 97 / 72; Pulse 140;                                                                  jl7 

09:04  / 73; Pulse 134; Resp 15; Pulse Ox 97% on R/A;                                   jl7 

09:30  / 71; Pulse 133; Resp 21; Pulse Ox 97% ;                                         jl7 

10:20  / 74; Pulse 144; Resp 25; Pulse Ox 95% on R/A;                                   tw2 

10:49  / 75; Pulse 105; Resp 19; Pulse Ox 97% ;                                         jl7 

12:45  / 85; Pulse 122; Resp 19; Pulse Ox 96% ;                                         jl7 

08:39 Body Mass Index 59.30 (176.90 kg, 172.72 cm)                                            iw  

                                                                                                  

ED Course:                                                                                        

08:24 Patient arrived in ED.                                                                  ag5 

08:25 Aston Pizano MD is Private Physician.                                                 ag5 

08:30 Edwina Taylor MD is Attending Physician.                                           ma2 

08:30 EKG done, by ED staff, reviewed by Edwina Taylor MD.                                 jl7 

08:35 Inserted saline lock: 20 gauge in right antecubital area, using aseptic technique.      tw2 

      Blood collected.                                                                            

08:35 Initial lab(s) drawn, by me, sent to lab. First set of blood cultures drawn by me.      jl7 

08:41 Triage completed.                                                                       iw  

08:41 Arm band placed on.                                                                     iw  

08:43 Chaitanya Hicks, RN is Primary Nurse.                                                      jl7 

09:00 COVID swab sent to lab.                                                                 jl7 

09:08 Second set of blood cultures drawn by me. Inserted saline lock: 20 gauge in left hand,  jl7 

      using aseptic technique. Blood collected.                                                   

09:23 Patient has correct armband on for positive identification. Placed in gown. Bed in low  jl7 

      position. Call light in reach. Side rails up X2. Cardiac monitor on. Pulse ox on. NIBP      

      on.                                                                                         

10:37 Reid Galindo DO is Hospitalizing Provider.                                           ma2 

10:50 No provider procedures requiring assistance completed. Patient admitted, IV remains in  jl7 

      place. intact, No redness/swelling at site.                                                 

                                                                                                  

Administered Medications:                                                                         

08:43 Drug: Metoprolol 5 mg Route: IVP; Site: right antecubital;                              jl7 

08:48 Drug: Metoprolol 5 mg Route: IVP; Site: right antecubital;                              jl7 

08:50 Drug: Lasix 40 mg Route: IVP; Site: right antecubital;                                  jl7 

09:34 Follow up: Response: No adverse reaction                                                jl7 

12:51 Follow up: Urine output 450 ml                                                          jl7 

08:52 Drug: SOLU-Medrol 125 mg Route: IVP; Site: right antecubital;                           jl7 

09:35 Follow up: Response: No adverse reaction                                                jl7 

08:55 Drug: Aspirin Chewable Tablet 324 mg Route: PO;                                         jl7 

09:34 Follow up: Response: No adverse reaction                                                jl7 

09:04 Drug: Metoprolol 5 mg Route: IVP; Site: right antecubital;                              jl7 

09:30 Follow up: Response: No adverse reaction                                                jl7 

09:45 Drug: Diltiazem 25 mg Route: IVP; Site: right antecubital;                              jl7 

12:45 Follow up: Response: No adverse reaction                                                jl7 

10:48 Not Given (Physician Discretion): Diltiazem 30 mg IVP once; Over 2 Minutes              jl7 

                                                                                                  

                                                                                                  

Output:                                                                                           

12:51 Urine: 450ml; Total: 450ml.                                                             jl7 

                                                                                                  

Outcome:                                                                                          

10:37 Decision to Hospitalize by Provider.                                                    ma2 

12:45 Admitted to Tele accompanied by tech, family with patient, via wheelchair, room 218,    jl7 

      with chart, Report called to  MARIELLE Christina                                                    

12:45 Condition: stable                                                                           

12:45 Discharge instructions given to patient, family, Instructed on the need for admit,          

      Demonstrated understanding of instructions.                                                 

12:52 Patient left the ED.                                                                    jl7 

                                                                                                  

Signatures:                                                                                       

Kira Morris, RN                     RN   iw                                                   

Ingris Hernandez RN                          RN   tw2                                                  

Chaitanya Hicks RN RN   jl7                                                  

Edwina Taylor MD MD   ma2                                                  

Maddison Tristan                                5                                                  

                                                                                                  

Corrections: (The following items were deleted from the chart)                                    

08:42 08:39  / 91; Pulse 174bpm; Resp 20bpm; Spontaneous; Pulse Ox 98% RA; iw           iw  

09:22 09:16  / 73; Pulse 134bpm; Resp 15bpm; Pulse Ox 97% RA; tw2                       jl7 

                                                                                                  

**************************************************************************************************

## 2020-12-09 LAB — TSH SERPL DL<=0.05 MIU/L-ACNC: 1.39 UIU/ML (ref 0.36–3.74)

## 2020-12-09 RX ADMIN — FAMOTIDINE SCH MG: 20 TABLET, FILM COATED ORAL at 08:47

## 2020-12-09 RX ADMIN — Medication SCH ML: at 21:30

## 2020-12-09 RX ADMIN — Medication SCH ML: at 08:48

## 2020-12-09 RX ADMIN — FUROSEMIDE SCH MG: 10 INJECTION, SOLUTION INTRAVENOUS at 17:04

## 2020-12-09 RX ADMIN — APIXABAN SCH MG: 5 TABLET, FILM COATED ORAL at 08:47

## 2020-12-09 RX ADMIN — FAMOTIDINE SCH MG: 20 TABLET, FILM COATED ORAL at 21:30

## 2020-12-09 RX ADMIN — SOTALOL HYDROCHLORIDE SCH MG: 80 TABLET ORAL at 18:34

## 2020-12-09 RX ADMIN — FUROSEMIDE SCH MG: 10 INJECTION, SOLUTION INTRAVENOUS at 08:47

## 2020-12-09 RX ADMIN — APIXABAN SCH MG: 5 TABLET, FILM COATED ORAL at 21:30

## 2020-12-09 RX ADMIN — SOTALOL HYDROCHLORIDE SCH MG: 80 TABLET ORAL at 08:46

## 2020-12-09 NOTE — EKG
Test Date:    2020-12-08               Test Time:    08:36:01

Technician:   RACHEL                                    

                                                     

MEASUREMENT RESULTS:                                       

Intervals:                                           

Rate:         170                                    

NJ:                                                  

QRSD:         84                                     

QT:           266                                    

QTc:          447                                    

Axis:                                                

P:                                                   

NJ:                                                  

QRS:          44                                     

T:            -11                                    

                                                     

INTERPRETIVE STATEMENTS:                                       

                                                     

Atrial fibrillation with rapid ventricular response

Low voltage QRS

Nonspecific ST and T wave abnormality, probably digitalis effect

Abnormal ECG

Compared to ECG 05/31/2020 19:55:29

Low QRS voltage now present

ST (T wave) deviation now present

Sinus rhythm no longer present



Electronically Signed On 12-09-20 07:09:13 CST by Evan Olivera

## 2020-12-09 NOTE — P.PN
Subjective


Date of Service: 12/09/20


Primary Care Provider: Dr. Horvath; Cardiology-Dr. Mir


Chief Complaint: Dyspnea, palpitations


Subjective: Improving (Patient required multiple doses of IV metoprolol last 

night.  Rate better controlled.  Less short of breath noted.)





Physical Examination





- Vital Signs


Temperature: 97.2 F


Blood Pressure: 116/89


Pulse: 65


Respirations: 22


Pulse Ox (%): 95





- Physical Exam


General: Alert, In no apparent distress, Oriented x3, Cooperative


HEENT: Atraumatic


Neck: Supple


Respiratory: Crackles/rales


Cardiovascular: Irregular heart rate/rhythm (AFib rate controlled)


Gastrointestinal: Normal bowel sounds, No masses, No rebound, No guarding


Integumentary: Tenderness/swelling (Less swelling noted to the lower 

extremities)


Neurological: Normal speech, Normal strength at 5/5 x4 extr, Normal tone





- Studies


Laboratory Data (last 24 hrs)





12/08/20 08:45: APTT Cancelled


12/08/20 08:45: Lipase Cancelled


12/08/20 08:37: PT Cancelled, INR Cancelled


12/08/20 08:37: Magnesium Cancelled, Total Bilirubin Cancelled, AST Cancelled, 

ALT Cancelled, Alkaline Phosphatase Cancelled


12/08/20 08:37: WBC Cancelled, Hgb Cancelled, Hct Cancelled, Plt Count Cancelled


12/08/20 08:37: Sodium Cancelled, Potassium Cancelled, BUN Cancelled, Creatinine

Cancelled, Glucose Cancelled


12/08/20 08:35: PT 14.7 H, INR 1.25, APTT 29.9


12/08/20 08:35: WBC 8.3, Hgb 14.1, Hct 43.0, Plt Count 248


12/08/20 08:35: Sodium 139, Potassium 4.1, BUN 20 H, Creatinine 0.82, Glucose 

110 H, Magnesium 2.4, Total Bilirubin 1.8 H, AST 25, ALT 47, Alkaline 

Phosphatase 77, Lipase 85





Medications List Reviewed: Yes





Assessment & Plan


Discharge Plan: Home


Plan to discharge in: 24 Hours


Physician Review Additional Text: 








Impression:


Dyspnea with exertion, palpitations secondary to atrial fibrillation with RVR, 

new


Acute on chronic diastolic CHF


Hypertension


Obstructive sleep apnea


Suspect pre diabetes


Morbid obesity





Plan:


Dyspnea with exertion, palpitations secondary to atrial fibrillation with RVR, 

new:  Patient required multiple doses of IV Lopressor last night.  Medication 

was adjusted.  Case discussed in detail with cardiology this morning.  Will 

discontinue metoprolol.  Changed to Betapace.  Will need to monitor at least for

3 doses.  Will also discontinue Lovenox and switch to Eliquis.  Anticipate 

improvement with sotalol.  Will continue to monitor closely.  Continue IV Lasix.

 Echo ordered.  Awaiting on tsh and free T4.  Anticipate improvement and 

possible discharge likely tomorrow. 


Acute on chronic diastolic CHF:  Continue IV Lasix.  Continue fluid restriction.

 Await echo results.


Hypertension:  Hold Norvasc and losartan at this time.  Patient will be on 

sotalol.  Continue monitor blood pressure off losartan and Norvasc.


Obstructive sleep apnea:  Patient uses CPAP at night.  Continue with CPAP at 

night


Suspect pre diabetes:  Will check A1c.


Morbid obesity:  Address lifestyle modification education.


Time Spent Managing Pts Care (In Minutes): 55

## 2020-12-10 VITALS — TEMPERATURE: 97.4 F | SYSTOLIC BLOOD PRESSURE: 120 MMHG | DIASTOLIC BLOOD PRESSURE: 91 MMHG

## 2020-12-10 VITALS — OXYGEN SATURATION: 93 %

## 2020-12-10 LAB
BUN BLD-MCNC: 26 MG/DL (ref 7–18)
GLUCOSE SERPLBLD-MCNC: 97 MG/DL (ref 74–106)
HCT VFR BLD CALC: 39.7 % (ref 39.6–49)
HDLC SERPL-MCNC: 34 MG/DL (ref 40–60)
LDLC SERPL CALC-MCNC: 53 MG/DL (ref ?–130)
LYMPHOCYTES # SPEC AUTO: 1.3 K/UL (ref 0.7–4.9)
MAGNESIUM SERPL-MCNC: 2.3 MG/DL (ref 1.8–2.4)
PMV BLD: 8.6 FL (ref 7.6–11.3)
POTASSIUM SERPL-SCNC: 3.9 MMOL/L (ref 3.5–5.1)
RBC # BLD: 4.51 M/UL (ref 4.33–5.43)
TSH SERPL DL<=0.05 MIU/L-ACNC: 1.83 UIU/ML (ref 0.36–3.74)

## 2020-12-10 RX ADMIN — SOTALOL HYDROCHLORIDE SCH MG: 80 TABLET ORAL at 05:08

## 2020-12-10 RX ADMIN — Medication SCH ML: at 08:52

## 2020-12-10 RX ADMIN — FUROSEMIDE SCH MG: 10 INJECTION, SOLUTION INTRAVENOUS at 08:51

## 2020-12-10 RX ADMIN — FAMOTIDINE SCH MG: 20 TABLET, FILM COATED ORAL at 08:51

## 2020-12-10 RX ADMIN — APIXABAN SCH MG: 5 TABLET, FILM COATED ORAL at 08:51

## 2020-12-10 NOTE — P.DS
Admission Date: 12/09/20


Discharge Date: 12/10/20


Primary Care Provider: Dr. Horvath; Cardiology-Dr. Mir


Disposition: ROUTINE DISCHARGE


Discharge Condition: GOOD


Reason for Admission: Dyspnea, palpitations


Consultations: 





Cardiology-Dr. Olivera





Procedures: 








CT Scan: 


FINDINGS:  A pulmonary embolus is not seen. 


A thoracic aortic aneurysm is not noted. The heart is enlarged 


A pleural effusion is not seen. A pericardial effusion is not seen. 


Mild to moderate diffuse ground-glass opacities within the lungs bilaterally 


IMPRESSION:  Negative for a pulmonary embolism. 


Mild to moderate diffuse ground-glass opacities within the lungs bilaterally may

indicate pulmonary edema, pneumonitis or pneumonia





Medical problem list: 


Dyspnea with exertion, palpitations secondary to atrial fibrillation with RVR, 

new


Acute on chronic diastolic CHF


Hypertension


Obstructive sleep apnea


Suspect pre diabetes


Morbid obesity








Brief History of Present Illness: 





40-year-old  male with history of diastolic CHF, hypertension, 

obstructive sleep apnea.





Patient presented with increasing shortness of breath over the past several 

weeks.  He also reported palpitations during this time.  Over the past week he 

has noted increasing shortness of breath with exertion.  He denies any fever, 

chills, chest pain. He also denies any nausea, vomiting and abdominal pain. He 

came to the ER for further evaluation.





In the ER patient was evaluated.  Patient found to be in atrial fibrillation 

with RVR.  Rate around 160-170.  Blood pressure 130/91.  On lab white count 8.3,

hemoglobin 14 periods platelet count 240 for 8. Sodium 139, potassium 4.1.  BUN 

of 20, creatinine 0.8 with a GFR greater than 90.  Glucose 110.  Troponin 

unremarkable.  BNP 1480. Total bilirubin 1.8.  Patient was given several doses 

of Lopressor IV, Lasix IV and Solu-Medrol.  Patient heart rate better contro

lled.  Patient admitted for further evaluation and treatment.





When I saw the patient ER, patient appeared comfortable.  Heart rate still 

around 100-120.  Patient did not look in any severe distress. Patient has 

reported increase fluid intake over the past several days.  Some edema noted to 

the lower extremities.


Hospital Course: 





Patient presented with dyspnea with exertion and palpitation.  Patient found to 

have new onset atrial fibrillation with RVR.  The patient was admitted for 

treatment.  Cardiology was consulted to further evaluate.  CT scan showed no 

pulmonary embolism.  Patient was initially started on Lopressor.  Patient 

remained in atrial fibrillation.  Cardiology recommended to change Lopressor to 

Betapace.  Patient has done well on Betapace.  Patient remains in atrial 

fibrillation with rate controlled.  At discharge the patient will continue with 

Betapace 80 mg 1 pill twice daily and Eliquis 5 mg 1 pill twice daily.  

Education on atrial fibrillation, Betapace and Eliquis will be provided.  

Recommend follow up with cardiology in 1-2 weeks to follow up this 

hospitalization and continue his care.  Okay to return to her work on Monday.





Patient had dyspnea also related to acute on chronic diastolic CHF.  IV Lasix 

was initiated.  Patient takes Lasix 40 mg daily.  Patient continue with a 1500 

cc per day fluid restriction and medication.  Mild edema to the lower 

extremities improved.  Breathing also improved.  At discharge patient will 

continue with a 1500 cc per day fluid restriction and low-salt diet.  Recommend 

to monitor weight daily.  If his weight increases by more than 5 lb he is to 

contact his PCP for further recommendation.  At discharge he will continue with 

Lasix 40 mg 1 pill twice daily.





Patient with hypertension.  Blood pressure well controlled off his prior 

medications including Norvasc, losartan and metoprolol.  Patient now on sotalol.

 For now Norvasc, losartan and metoprolol have been discontinued.  Recommend to 

monitor blood pressure daily.  Recommend to maintain blood pressure less than 

130/80.  If his blood pressure elevates consider restarting losartan.  This can 

be further addressed by his PCP or cardiology.





Patient has obstructive sleep apnea.  Patient uses CPAP at home.  Patient will 

continue with CPAP as directed.  Recommend follow up with pulmonology to further

monitor and address.





Patient had elevated blood sugar.  Hemoglobin A1c 6.3.  Patient with pre 

diabetes.  Recommend to recheck hemoglobin A1c in 3-6 months to monitor his 

progress.  Education on pre diabetes will be provided.  This can be further 

addressed by his PCP.





Patient with morbid obesity.  BMI 59.  Lifestyle modification education 

provided.  This can be further evaluated and treated as an outpatient.


Vital Signs/Physical Exam: 














Temp Pulse Resp BP Pulse Ox


 


 97.0 F   83   16   108/74   98 


 


 12/10/20 04:00  12/10/20 04:00  12/10/20 04:00  12/10/20 04:00  12/10/20 04:00








General: Alert, In no apparent distress, Oriented x3, Cooperative


HEENT: Atraumatic


Neck: Supple


Respiratory: Clear to auscultation bilaterally


Cardiovascular: Irregular heart rate/rhythm (AFib rate controlled)


Gastrointestinal: Normal bowel sounds, No tenderness, No masses, No rebound, No 

guarding


Musculoskeletal: No erythema, No tenderness, No warmth


Integumentary: No tenderness/swelling, No erythema, No warmth, No cyanosis


Neurological: Normal speech, Normal strength at 5/5 x4 extr, Normal tone, Normal

affect


Laboratory Data at Discharge: 














WBC  6.4 K/uL (4.3-10.9)  D 12/10/20  04:12    


 


Hgb  13.0 g/dL (13.6-17.9)  L  12/10/20  04:12    


 


Hct  39.7 % (39.6-49.0)   12/10/20  04:12    


 


Plt Count  251 K/uL (152-406)   12/10/20  04:12    


 


PT  Cancelled   12/08/20  08:37    


 


INR  Cancelled   12/08/20  08:37    


 


APTT  Cancelled   12/08/20  08:45    


 


Sodium  139 mmol/L (136-145)   12/10/20  04:12    


 


Potassium  3.9 mmol/L (3.5-5.1)   12/10/20  04:12    


 


BUN  26 mg/dL (7-18)  H  12/10/20  04:12    


 


Creatinine  0.83 mg/dL (0.55-1.3)   12/10/20  04:12    


 


Glucose  97 mg/dL ()   12/10/20  04:12    


 


Magnesium  2.3 mg/dL (1.8-2.4)   12/10/20  04:12    


 


Total Bilirubin  Cancelled   12/08/20  08:37    


 


AST  Cancelled   12/08/20  08:37    


 


ALT  Cancelled   12/08/20  08:37    


 


Alkaline Phosphatase  Cancelled   12/08/20  08:37    


 


Troponin I  < 0.02 ng/mL (0.0-0.045)   12/08/20  22:43    


 


Triglycerides  71 mg/dL (<150)   12/10/20  04:12    


 


Cholesterol  101 mg/dL (<200)   12/10/20  04:12    


 


HDL Cholesterol  34 mg/dL (40-60)  L  12/10/20  04:12    


 


Cholesterol/HDL Ratio  2.97   12/10/20  04:12    


 


Lipase  Cancelled   12/08/20  15:05    








Home Medications: 








Potassium Chloride [Micro-K] 10 meq PO DAILY 12/08/20 


Apixaban [Eliquis] 5 mg PO BID #60 tablet 12/10/20 


Furosemide [Lasix*] 40 mg PO BID #60 tab 12/10/20 


Sotalol HCl [Betapace*] 80 mg PO BID 6AM 6PM #60 tab 12/10/20 





New Medications: 


Sotalol HCl [Betapace*] 80 mg PO BID 6AM 6PM #60 tab


Apixaban [Eliquis] 5 mg PO BID #60 tablet


Furosemide [Lasix*] 40 mg PO BID #60 tab


Patient Discharge Instructions: 1.  Recommend follow up with PCP in 1 week to 

follow up this hospitalization.  2.  Patient presented with dyspnea with 

exertion and palpitation.  Patient found to have new onset atrial fibrillation 

with RVR.  The patient was admitted for treatment.  Cardiology was consulted to 

further evaluate.  CT scan showed no pulmonary embolism.  Patient was initially 

started on Lopressor.  Patient remained in atrial fibrillation.  Cardiology 

recommended to change Lopressor to Betapace.  Patient has done well on Betapace.

  Patient remains in atrial fibrillation with rate controlled.  At discharge the

 patient will continue with Betapace 80 mg 1 pill twice daily and Eliquis 5 mg 1

 pill twice daily.  Education on atrial fibrillation, Betapace and Eliquis will 

be provided.  Recommend follow up with cardiology in 1-2 weeks to follow up this

 hospitalization and continue his care.  Okay to return to work on Monday.  3.  

Patient had dyspnea also related to acute on chronic diastolic CHF.  IV Lasix 

was initiated.  Patient takes Lasix 40 mg daily.  Patient continue with a 1500 

cc per day fluid restriction and medication.  Mild edema to the lower 

extremities improved.  Breathing also improved.  At discharge patient will 

continue with a 1500 cc per day fluid restriction and low-salt diet.  Recommend 

to monitor weight daily.  If his weight increases by more than 5 lb he is to 

contact his PCP for further recommendation.  At discharge he will continue with 

Lasix 40 mg 1 pill twice daily.  4.  Patient with hypertension.  Blood pressure 

well controlled off his prior medications including Norvasc, losartan and 

metoprolol.  Patient now on sotalol.  For now Norvasc, losartan and metoprolol 

have been discontinued.  Recommend to monitor blood pressure daily.  Recommend 

to maintain blood pressure less than 130/80.  If his blood pressure elevates 

consider restarting losartan.  This can be further addressed by his PCP or 

cardiology.  5.  Patient has obstructive sleep apnea.  Patient uses CPAP at 

home.  Patient will continue with CPAP as directed.  Recommend follow up with 

pulmonology to further monitor and address.  6.  Patient had elevated blood 

sugar.  Hemoglobin A1c 6.3.  Patient with pre diabetes.  Recommend to recheck 

hemoglobin A1c in 3-6 months to monitor his progress.  Education on pre diabetes

 will be provided.  This can be further addressed by his PCP.  7.  Patient with 

morbid obesity.  BMI 59.  Lifestyle modification education provided.  This can 

be further evaluated and treated as an outpatient.


Diet: AHA


Activity: Ad oumou


Followup: 


Aston Pizano MD [Primary Care Provider] - 


Time spent managing pt's care (in minutes): 55

## 2020-12-10 NOTE — ECHO
HEIGHT: 5 ft 8 in   WEIGHT: 389 lb 0 oz   DATE OF STUDY: 12/09/2020   REFER DR: Reid Galindo DO

2-DIMENSIONAL: YES

     M.MODE: YES

 DOPPLER: YES

COLOR FLOW: YES



                    TDS:  YES

PORTABLE: YES

 DEFINITY:  NO

BUBBLE STUDY: NO





DIAGNOSIS:  ATRIAL FIBRILLATION WITH RAPID VENTRICULAR RESPONSE



CARDIAC HISTORY:  

CATHERIZATION: NO

SURGERY: NO

PROSTHETIC VALVE: NO

PACEMAKER: NO





MEASUREMENTS (cm)

    DIASTOLIC (NORMALS)                 SYSTOLIC (NORMALS)

IVSd                 1.3 (0.6-1.2)                    LA Diam 4.6 (1.9-4.0)     LVEF       
  42%  

LVIDd               5.1 (3.5-5.7)                        LVIDs      4.1 (2.0-3.5)     %FS  
        21%

LVPWd             1.5 (0.6-1.2)

Ao Diam           2.8 (2.0-3.7)



2 DIMENSIONAL ASSESSMENT:

RIGHT ATRIUM:                   NORMAL

LEFT ATRIUM:       DILATED



RIGHT VENTRICLE:            NORMAL

LEFT VENTRICLE: NORMAL SIZE



TRICUSPID VALVE:             NORMAL

MITRAL VALVE:     NORMAL



PULMONIC VALVE:             NORMAL

AORTIC VALVE:     NORMAL



PERICARDIAL EFFUSION: NONE

AORTIC ROOT:      NORMAL





LEFT VENTRICULAR WALL MOTION:     MILD GLOBAL HYPOKINESIS.



DOPPLER/COLOR FLOW:     NORMAL



COMMENTS:      MILD GLOBAL HYPOKINESIS. LEFT VENTRICULAR EJECTION FRACTION 40%. LEFT 
ATRIAL ENLARGEMENT. LEFT VENTRICULAR HYPERTROPHY.



TECHNOLOGIST:   RUPINDER LANDA

## 2020-12-13 NOTE — CON
Date of Consultation:  12/09/2020



Admitted to Dr. Galindo on 12/08/2020.  I saw the patient on 12/09/2020.



Reason For Consultation:  Atrial fibrillation.



History Of Present Illness:  Mr. Sarah is a 40-year-old, who has had a history of diastolic conge
stive heart failure, hypertension, gastric bypass in the past.  Came in with new onset atrial fibrill
ation, palpitation, shortness of breath.  No chest pain.  Denied any nausea, vomiting, diaphoresis, P
ND, orthopnea, pedal edema, or syncope.  Denied any fever or chills.



Allergies:  NONE.



Review of Systems:

Negative.



Social History:  Negative.



Family History:  Negative.



Medications:  Include Norvasc, Lasix, metoprolol.



Physical Examination:

Vital Signs:  He weighed 390 pounds. 

HEENT:  Negative. 

Neck:  Supple with no bruit. 

Chest:  Revealed some rales both bases. 

Cardiac:  Revealed atrial fibrillation. 

Abdomen:  Obese, but benign. 

Extremities:  Revealed no clubbing, cyanosis.  He had 1+ edema.



Diagnostic Data:  Fairly adequate except for atrial fibrillation.  His troponin was negative.  Echoca
rdiogram in 2018 showed diastolic dysfunction.  CT angiogram was negative for pulmonary embolus.  TSH
 is pending.



Impression And Plan:  New onset atrial fibrillation.  The patient with obesity, hypertension, and con
gestive heart failure.  I think we need to put him on sotalol.  I think we need to get an echocardiog
rayshawn and get a thyroid level and put him on anticoagulation.  We will see what that shows before makin
valentino further decisions.  I will see him in followup in the next week or 2.  Case was discussed with Dr. Galindo.





NB/CARLOS

DD:  12/13/2020 12:00:23Voice ID:  [id number]

DT:  12/13/2020 13:24:01Report ID:  [id number]

## 2021-01-22 LAB
BUN BLD-MCNC: 14 MG/DL (ref 7–18)
GLUCOSE SERPLBLD-MCNC: 102 MG/DL (ref 74–106)
HCT VFR BLD CALC: 45.2 % (ref 39.6–49)
INR BLD: 2.19
LYMPHOCYTES # SPEC AUTO: 1.3 K/UL (ref 0.7–4.9)
PMV BLD: 8.9 FL (ref 7.6–11.3)
POTASSIUM SERPL-SCNC: 3.9 MMOL/L (ref 3.5–5.1)
RBC # BLD: 5.26 M/UL (ref 4.33–5.43)

## 2021-01-25 ENCOUNTER — HOSPITAL ENCOUNTER (OUTPATIENT)
Dept: HOSPITAL 97 - CCL | Age: 41
Discharge: HOME | End: 2021-01-25
Attending: INTERNAL MEDICINE
Payer: COMMERCIAL

## 2021-01-25 VITALS — SYSTOLIC BLOOD PRESSURE: 104 MMHG | DIASTOLIC BLOOD PRESSURE: 76 MMHG | TEMPERATURE: 97 F | OXYGEN SATURATION: 96 %

## 2021-01-25 DIAGNOSIS — Z20.822: ICD-10-CM

## 2021-01-25 DIAGNOSIS — I48.91: Primary | ICD-10-CM

## 2021-01-25 PROCEDURE — 85730 THROMBOPLASTIN TIME PARTIAL: CPT

## 2021-01-25 PROCEDURE — 85025 COMPLETE CBC W/AUTO DIFF WBC: CPT

## 2021-01-25 PROCEDURE — 36415 COLL VENOUS BLD VENIPUNCTURE: CPT

## 2021-01-25 PROCEDURE — 85610 PROTHROMBIN TIME: CPT

## 2021-01-25 PROCEDURE — 93005 ELECTROCARDIOGRAM TRACING: CPT

## 2021-01-25 PROCEDURE — 92960 CARDIOVERSION ELECTRIC EXT: CPT

## 2021-01-25 PROCEDURE — 80048 BASIC METABOLIC PNL TOTAL CA: CPT

## 2021-01-25 NOTE — OP
Date of Procedure:  01/25/2021



Surgeon:  Evan Olivera MD



Assistant:  Dee Dee Sandoval.



Procedure:  Direct current cardioversion for atrial fibrillation.



Indication:  Mr. Sarah is a 40-year-old Latin-American male with new onset atrial fibrillation, s
ymptomatic low ejection fraction, unresponsive to sotalol, had been on Eliquis for 3 weeks, brought t
o the cath lab today as an outpatient for cardioversion.  He received a total of 20 mg of Versed IV p
ush.  He received 1 shock at 100 and 2 shocks at 200 joules before he converted to sinus rhythm.  He 
also received 15 mg of Lopressor IV push.  There were no complications.  No blood loss.



Postoperative Diagnosis:  Successful cardioversion from atrial fibrillation to sinus rhythm.  I am go
ing to change his Betapace to Multaq.  He will continue the Eliquis and he will go home today when he
 wakes up.  Total conscious sedation was 30 minutes.



Plan:  If he goes back into atrial fibrillation on Multaq, we will send him for ablation.





NB/CARLOS

DD:  01/25/2021 08:01:50Voice ID:  [id number]

DT:  01/25/2021 08:26:14Report ID:  [id number]

## 2022-03-30 ENCOUNTER — HOSPITAL ENCOUNTER (OUTPATIENT)
Dept: HOSPITAL 97 - ER | Age: 42
Setting detail: OBSERVATION
LOS: 1 days | Discharge: HOME | End: 2022-03-31
Payer: COMMERCIAL

## 2022-03-30 VITALS — BODY MASS INDEX: 60.8 KG/M2

## 2022-03-30 DIAGNOSIS — G47.33: ICD-10-CM

## 2022-03-30 DIAGNOSIS — I48.20: Primary | ICD-10-CM

## 2022-03-30 DIAGNOSIS — Z82.49: ICD-10-CM

## 2022-03-30 DIAGNOSIS — Z98.84: ICD-10-CM

## 2022-03-30 DIAGNOSIS — Z20.822: ICD-10-CM

## 2022-03-30 DIAGNOSIS — Z79.82: ICD-10-CM

## 2022-03-30 DIAGNOSIS — I11.0: ICD-10-CM

## 2022-03-30 DIAGNOSIS — Z79.899: ICD-10-CM

## 2022-03-30 DIAGNOSIS — E66.01: ICD-10-CM

## 2022-03-30 DIAGNOSIS — I50.22: ICD-10-CM

## 2022-03-30 LAB
BUN BLD-MCNC: 16 MG/DL (ref 7–18)
GLUCOSE SERPLBLD-MCNC: 126 MG/DL (ref 74–106)
HCT VFR BLD CALC: 45 % (ref 39.6–49)
LYMPHOCYTES # SPEC AUTO: 1.8 K/UL (ref 0.7–4.9)
PMV BLD: 7.4 FL (ref 7.6–11.3)
POTASSIUM SERPL-SCNC: 3.6 MMOL/L (ref 3.5–5.1)
RBC # BLD: 5.11 M/UL (ref 4.33–5.43)
TROPONIN I SERPL HS-MCNC: 11.3 PG/ML (ref ?–58.9)
TSH SERPL DL<=0.05 MIU/L-ACNC: 1.14 UIU/ML (ref 0.36–3.74)

## 2022-03-30 PROCEDURE — 36415 COLL VENOUS BLD VENIPUNCTURE: CPT

## 2022-03-30 PROCEDURE — 84484 ASSAY OF TROPONIN QUANT: CPT

## 2022-03-30 PROCEDURE — 96365 THER/PROPH/DIAG IV INF INIT: CPT

## 2022-03-30 PROCEDURE — 94760 N-INVAS EAR/PLS OXIMETRY 1: CPT

## 2022-03-30 PROCEDURE — 84443 ASSAY THYROID STIM HORMONE: CPT

## 2022-03-30 PROCEDURE — 80048 BASIC METABOLIC PNL TOTAL CA: CPT

## 2022-03-30 PROCEDURE — 71045 X-RAY EXAM CHEST 1 VIEW: CPT

## 2022-03-30 PROCEDURE — 83735 ASSAY OF MAGNESIUM: CPT

## 2022-03-30 PROCEDURE — 93005 ELECTROCARDIOGRAM TRACING: CPT

## 2022-03-30 PROCEDURE — 99285 EMERGENCY DEPT VISIT HI MDM: CPT

## 2022-03-30 PROCEDURE — 92960 CARDIOVERSION ELECTRIC EXT: CPT

## 2022-03-30 PROCEDURE — 80053 COMPREHEN METABOLIC PANEL: CPT

## 2022-03-30 PROCEDURE — 84439 ASSAY OF FREE THYROXINE: CPT

## 2022-03-30 PROCEDURE — 85025 COMPLETE CBC W/AUTO DIFF WBC: CPT

## 2022-03-30 RX ADMIN — ENOXAPARIN SODIUM SCH MG: 100 INJECTION SUBCUTANEOUS at 12:20

## 2022-03-30 RX ADMIN — ENOXAPARIN SODIUM SCH MG: 100 INJECTION SUBCUTANEOUS at 20:47

## 2022-03-30 RX ADMIN — METOPROLOL TARTRATE SCH MG: 50 TABLET, FILM COATED ORAL at 20:47

## 2022-03-30 NOTE — P.HP
Certification for Inpatient


Patient admitted to: Observation


With expected LOS: <2 Midnights


Practitioner: I am a practitioner with admitting privileges, knowledge of 

patient current condition, hospital course, and medical plan of care.


Services: Services provided to patient in accordance with Admission requirements

found in Title 42 Section 412.3 of the Code of Federal Regulations





Patient History


Date of Service: 03/30/22


Reason for admission: afib with RVR


History of Present Illness: 





42yo M, PMH: Afib, LALITHA, HTN


Presents to ED due to palpitations, chest discomfort since this morning. H/o 

afib, previously cardioverted, no longer on anticoagulation. In his usual state 

of health up until this occurred this morning. Denies missing any medications, 

no recent change in medications. In the ED, he was noted to be in afib with RVR 

to 170s. Cardiology consulted and patient started on amio drip with improvement.





Allergies





No Known Allergies Allergy (Verified 01/22/21 13:06)


   





Home Medications: 








Potassium Chloride [Micro-K] 10 meq PO DAILY 12/08/20 


Furosemide [Lasix*] 40 mg PO BID #60 tab 12/10/20 


Aspirin [Aspirin EC 81 MG] 81 mg PO DAILY 03/30/22 


Losartan Potassium 50 mg PO DAILY 03/30/22 


Metoprolol Succinate 50 mg PO BID 03/30/22 








- Past Medical/Surgical History


Diabetic: No


-: Hypertension


-: Diastolic CHF


-: Obstructive sleep apnea


-: Morbid obesity


-: Gastric Bypass, Lap Band (2008)


Psychosocial/ Personal History: Patient is .  He works as a fork lift 







- Family History


  ** Father


-: Heart disease


Notes: AFib





  ** Mother


-: Heart disease


Notes: AFib





- Social History


Smoking Status: Never smoker


Alcohol use: No


CD- Drugs: No


Caffeine use: Yes


Place of Residence: Home





Review of Systems


10-point ROS is otherwise unremarkable





Physical Examination





- Physical Exam


General: Alert, In no apparent distress, Oriented x3


HEENT: Sclerae nonicteric


Respiratory: Clear to auscultation bilaterally, Normal air movement


Cardiovascular: No edema, Irregular heart rate/rhythm


Gastrointestinal: Soft and benign, Non-distended, No tenderness


Musculoskeletal: No tenderness


Integumentary: No rashes, No significant lesion


Neurological: Normal speech, Normal strength at 5/5 x4 extr, Normal affect





- Studies


Laboratory Data (last 24 hrs)





03/30/22 07:35: WBC 7.2, Hgb 15.3, Hct 45.0, Plt Count 251


03/30/22 07:35: Sodium 138, Potassium 3.6, BUN 16, Creatinine 0.87, Glucose 126 

H








Assessment and Plan





- Advance Directives


Does patient have a Living Will: No


Does patient have a Durable POA for Healthcare: No


Physician Review Additional Text: 





Problem List


Afib with RVR, h/o Afib


HTN


CHF, unknown type


LALITHA





cardiology consulted


started amio drip in ED


monitor on telemetry


confirm home meds, restart as appropriate


patient states he took his metoprolol this morning


cover with therapeutic lovenox for now, will likely need DOAC on discharge





VTE: lovenox therapeutic


Code: full


Dispo: home, 1-2 days








Time Spent Managing Pts Care (In Minutes): 60

## 2022-03-30 NOTE — EDPHYS
Physician Documentation                                                                           

 [facility]                                                                 

Name: Chinyere Sarah III                                                                       

Age: 41 yrs                                                                                       

Sex: Male                                                                                         

: 1980                                                                                   

MRN: [id number]                                                                                   

Arrival Date: 2022                                                                          

Time: 07:24                                                                                       

Account#: [id number]                                                                            

Bed 20                                                                                            

Private MD: Aston Pizano                                                                         

ED Physician Mauro Ledesma                                                                         

HPI:                                                                                              

                                                                                             

08:12 This 41 yrs old  Male presents to ER via Ambulatory with complaints of Chest    kb  

      Pain, Chest Tightness, Palpitations, High Blood Pressure.                                   

08:12 The patient presents with a history of irregular heart beat. Context: The symptoms      kb  

      occur at rest. Onset: The symptoms/episode began/occurred this morning. Duration: The       

      patient or guardian reports a single episode, that is still ongoing. Modifying factors:     

      The symptoms are aggravated by nothing. The symptoms are alleviated by nothing.             

      Associated signs and symptoms: Pertinent positives: chest pain, Pertinent negatives:        

      anxiety, cough, fever, lightheadedness, nausea, SOB, syncope, near-syncope, unusual         

      stressors, vertigo, vomiting. Severity of symptoms: At their worst the symptoms were        

      moderate in the emergency department the symptoms are unchanged. The patient has            

      experienced similar episodes in the past, a few times. The patient has not recently         

      seen a physician. Pt reports he woke up with palpitations and chest tightness. Has had      

      similar episode in the past and was cardioverted. Sees Dr Olivera.                          

                                                                                                  

Historical:                                                                                       

- Allergies:                                                                                      

07:32 No Known Allergies;                                                                     ll1 

- Home Meds:                                                                                      

08:34 metoprolol succinate 50 mg oral Tb24 1 tab twice a day [Active]; furosemide 40 mg Oral  ph  

      tab 1 tab 2 times per day [Active]; potassium chloride 10 mEq Oral cpER 1 cap once          

      daily [Active]; losartan 50 mg oral tab 1 tab once daily [Active]; aspirin 81 mg Oral       

      tab 81 mg daily [Active];                                                                   

- PMHx:                                                                                           

07:32 CHF; Hypertension;                                                                      ll1 

                                                                                                  

- Immunization history:: Client reports receiving the 2nd dose of the Covid vaccine.              

- Social history:: Smoking status: Patient denies any tobacco usage or history of.                

                                                                                                  

                                                                                                  

ROS:                                                                                              

07:47 Constitutional: Negative for fever, chills, and weight loss.                            kb  

07:47 Cardiovascular: Positive for chest pain, palpitations.                                      

07:47 All other systems are negative.                                                             

                                                                                                  

Exam:                                                                                             

07:47 Constitutional:  This is a well developed, well nourished patient who is awake, alert,  kb  

      and in no acute distress. Head/Face:  Normocephalic, atraumatic. ENT:  Moist Mucous         

      membranes Respiratory:  Respirations even and unlabored. No increased work of               

      breathing. Talking in full sentences Skin:  Warm, dry with normal turgor.  Normal           

      color. MS/ Extremity:  Pulses equal, no cyanosis.  Neurovascular intact.  Full, normal      

      range of motion. Neuro:  Awake and alert, GCS 15, oriented to person, place, time, and      

      situation. Moves all extremities. Normal gait. Psych:  Awake, alert, with orientation       

      to person, place and time.  Behavior, mood, and affect are within normal limits.            

07:47 Cardiovascular: Rate: tachycardic, Rhythm: irregularly irregular, Pulses: no pulse          

      deficits are appreciated.                                                                   

07:47 ECG was reviewed by the Attending Physician.                                                

                                                                                                  

Vital Signs:                                                                                      

07:32  / 101; Pulse 172; Resp 16; Pulse Ox 99% on R/A;                                  ph  

07:33  / 138; Pulse 105; Resp 20; Temp 98.6; Pulse Ox 100% ; Weight 181.44 kg; Height 5 ll1 

      ft. 8 in. (172.72 cm); Pain 6/10;                                                           

07:45  / 78; Pulse 163; Resp 18; Pulse Ox 100% on R/A;                                  ph  

08:00 BP 92 / 51; Pulse 152; Resp 16; Pulse Ox 98% on R/A;                                    ph  

08:15  / 90; Pulse 146; Resp 18; Pulse Ox 98% on R/A;                                   ph  

08:45  / 70; Pulse 133; Resp 16; Pulse Ox 99% on R/A;                                   ph  

09:15 BP 97 / 64; Pulse 129; Resp 16; Pulse Ox 98% on R/A;                                    ph  

09:35 BP 99 / 72; Pulse 127; Resp 16; Pulse Ox 98% on R/A;                                    ph  

09:52 BP 98 / 71; Pulse 122; Resp 18; Temp 97.5; Pulse Ox 95% on R/A;                         ph  

10:30  / 76; Pulse 141; Resp 18; Temp 97.8; Pulse Ox 96% on R/A;                        ph  

07:33 Body Mass Index 60.82 (181.44 kg, 172.72 cm)                                            ll1 

                                                                                                  

MDM:                                                                                              

07:26 Patient medically screened.                                                             kb  

07:46 Data reviewed: vital signs, nurses notes. Data interpreted: Pulse oximetry: on room air kb  

      is 100 %. Interpretation: normal. Physician consultation: Evan Olivera MD was              

      contacted at 07:46, would like medications started, amiodarone drip, in the emergency       

      department to see patient at 07:46.                                                         

08:12 Counseling: I had a detailed discussion with the patient and/or guardian regarding: the kb  

      historical points, exam findings, and any diagnostic results supporting the                 

      discharge/admit diagnosis, lab results, radiology results, the need for further work-up     

      and treatment in the hospital. Physician consultation: Willi Root MD was contacted       

      at 08:12, regarding admission, to the ICU, patient's condition, and will see patient in     

      ED.                                                                                         

                                                                                                  

                                                                                             

07:26 Order name: Basic Metabolic Panel; Complete Time: 08:08                                 kb  

                                                                                             

07:26 Order name: CBC with Diff; Complete Time: 07:54                                         kb  

                                                                                             

07:26 Order name: Troponin HS; Complete Time: 08:08                                           kb  

                                                                                             

08:06 Order name: COVID-19 SARS RT PCR (Document "Date of Onset" if Symptomatic); Complete    bd  

      Time: 10:10                                                                                 

                                                                                             

09:24 Order name: CBC with Automated Diff                                                     EDMS

                                                                                             

09:24 Order name: CBC with Automated Diff                                                     EDMS

                                                                                             

07:26 Order name: XRAY Chest (1 view); Complete Time: 07:54                                   kb  

                                                                                             

09:24 Order name: Comprehensive Metabolic Panel                                               EDMS

                                                                                             

09:24 Order name: Comprehensive Metabolic Panel                                               EDMS

                                                                                             

09:24 Order name: Magnesium                                                                   EDMS

                                                                                             

09:24 Order name: Magnesium                                                                   EDMS

                                                                                             

09:54 Order name: T4 Free; Complete Time: 10:10                                               EDMS

                                                                                             

09:54 Order name: Thyroid Stimulating Hormone; Complete Time: 10:10                           EDMS

                                                                                             

07:26 Order name: EKG; Complete Time: 07:27                                                   kb  

                                                                                             

07:26 Order name: Cardiac monitoring; Complete Time: 07:38                                    kb  

                                                                                             

07:26 Order name: EKG - Nurse/Tech; Complete Time: 07:38                                      kb  

                                                                                             

07:26 Order name: IV Saline Lock; Complete Time: 07:38                                        kb  

                                                                                             

07:26 Order name: Labs collected and sent; Complete Time: 07:38                               kb  

                                                                                             

07:26 Order name: O2 Per Protocol; Complete Time: 07:38                                       kb  

                                                                                             

07:26 Order name: O2 Sat Monitoring; Complete Time: 07:38                                     kb  

                                                                                             

09:23 Order name: CONS Physician Consult                                                      EDMS

                                                                                             

09:24 Order name: Heart Healthy                                                               EDMS

                                                                                             

09:24 Order name: EKG Electrocardiogram                                                       EDMS

                                                                                             

09:24 Order name: EKG Electrocardiogram                                                       EDMS

                                                                                                  

EC:47 Rate is 172 beats/min. Rhythm is irregularly irregular. QRS Axis is Normal. QRS         kb  

      interval is normal at 86 msec. QT interval is normal at 266 msec.                           

                                                                                                  

Administered Medications:                                                                         

07:47 CANCELLED (Duplicate Order): amiodarone 900 mg, D5W 500 ml IVPB at 1 mg/min continuous; ph  

      for 6 hrs, then change to 0.5 mg/min                                                        

07:56 Drug: amiodarone 150 mg Volume: 100 ml; Route: IVPB; Infused Over: 10 mins; Site: right   

      antecubital;                                                                                

08:06 Follow up: Response: No adverse reaction; IV Status: Completed infusion                 ph  

08:13 Drug: NS 0.9% 1000 ml Route: IV; Rate: 1000 ml; Site: right antecubital;                ph  

09:33 Follow up: Response: No adverse reaction; IV Status: Completed infusion; IV Intake:     ph  

      1000ml                                                                                      

08:19 Drug: amiodarone 900 mg, D5W 500 ml Route: IVPB; Rate: 1 mg/min; Site: right              

      antecubital;                                                                                

09:33 Follow up: IV Status: Infusion continued upon admission                                 ph  

                                                                                                  

                                                                                                  

Disposition:                                                                                      

14:52 Co-signature as Attending Physician, Mauro PHAM was immediately available on-site ms3 

      in the Emergency Department for consultation in the care of the patient..                   

                                                                                                  

Disposition Summary:                                                                              

22 08:16                                                                                    

Hospitalization Ordered                                                                           

      Hospitalization Status: Inpatient Admission                                             kb  

      Provider: Willi Root  

      Condition: Stable                                                                       kb  

      Problem: new                                                                            kb  

      Symptoms: are unchanged                                                                 kb  

      Bed/Room Type: Standard                                                                 kb  

      Location: Intensive Care Unit(22 10:07)                                           bd  

      Room Assignment: 7-(22 10:07)                                                     bd  

      Diagnosis                                                                                   

        - Unspecified atrial fibrillation                                                     kb  

        - Palpitations                                                                        kb  

      Forms:                                                                                      

        - Medication Reconciliation Form                                                      kb  

        - SBAR form                                                                           kb  

Signatures:                                                                                       

Dispatcher [employer]                           EDRabia Rehman, FNP-C                 FNP-Ckb                                                   

Carlita Browning Patricia, RN                      RN   ph                                                   

Eduardo Iraheta RN                       RN   ll1                                                  

Mauro Ledesma DO DO   ms3                                                  

                                                                                                  

Corrections: (The following items were deleted from the chart)                                    

07:47 07:42 amiodarone 900 mg, D5W 500 ml IVPB at 1 mg/min continuous; for 6 hrs, then change ph  

      to 0.5 mg/min ordered. ph                                                                   

10:07 08:16 Telemetry/MedSurg (Inpatient) kb                                                  bd  

10:07 08:16 kb                                                                                bd  

                                                                                                  

**************************************************************************************************

## 2022-03-30 NOTE — RAD REPORT
EXAM DESCRIPTION:  RAD - Chest Single View - 3/30/2022 7:45 am

 

CLINICAL HISTORY:  CHEST PAIN

 

COMPARISON:  Chest Single View dated 9/30/2018; Chest Single View dated 8/11/2018

 

FINDINGS:  Lines: None.

Lungs: Pulmonary vascular congestion.

Pleural: No significant pleural effusions or pneumothorax.

Cardiac: Cardiomegaly.

Bones: No acute fractures.

Other:

 

IMPRESSION:  Pulmonary vascular congestion without roderick pulmonary edema.

## 2022-03-30 NOTE — ER
Nurse's Notes                                                                                     

 [facility]                                                                 

Name: Chinyere Sarah III                                                                       

Age: 41 yrs                                                                                       

Sex: Male                                                                                         

: 1980                                                                                   

MRN: [id number]                                                                                   

Arrival Date: 2022                                                                          

Time: 07:24                                                                                       

Account#: [id number]                                                                            

Bed 20                                                                                            

Private MD: Aston Pizano                                                                         

Diagnosis: Unspecified atrial fibrillation;Palpitations                                           

                                                                                                  

Presentation:                                                                                     

                                                                                             

07:33 Chief complaint: Patient states: Palpitations and chest tightness started today. HR     ll1 

       at home. Coronavirus screen: Vaccine status: Patient reports receiving the 2nd       

      dose of the covid vaccine. Client denies travel out of the [place] in the last 14 days. At     

      this time, the client does not indicate any symptoms associated with coronavirus-19.        

      Ebola Screen: Patient denies travel to an Ebola-affected area in the 21 days before         

      illness onset. Initial Sepsis Screen: Does the patient meet any 2 criteria? No.             

      Patient's initial sepsis screen is negative. Does the patient have a suspected source       

      of infection? No. Patient's initial sepsis screen is negative. Risk Assessment: Do you      

      want to hurt yourself or someone else? Patient reports no desire to harm self or            

      others. Onset of symptoms was 2022.                                               

07:33 Method Of Arrival: Ambulatory                                                           ll1 

07:33 Acuity: YOSEF 3                                                                           ll1 

07:38 Acuity: YOSEF 2                                                                           ss  

                                                                                                  

Triage Assessment:                                                                                

07:34 General: Appears uncomfortable, Behavior is calm, cooperative, appropriate for age.     ss  

      Pain: Complains of pain in chest Quality of pain is described as pressure.                  

      Cardiovascular: Reports chest pain, palpitations.                                           

                                                                                                  

Historical:                                                                                       

- Allergies:                                                                                      

07:32 No Known Allergies;                                                                     ll1 

- Home Meds:                                                                                      

08:34 metoprolol succinate 50 mg oral Tb24 1 tab twice a day [Active]; furosemide 40 mg Oral  ph  

      tab 1 tab 2 times per day [Active]; potassium chloride 10 mEq Oral cpER 1 cap once          

      daily [Active]; losartan 50 mg oral tab 1 tab once daily [Active]; aspirin 81 mg Oral       

      tab 81 mg daily [Active];                                                                   

- PMHx:                                                                                           

07:32 CHF; Hypertension;                                                                      ll1 

                                                                                                  

- Immunization history:: Client reports receiving the 2nd dose of the Covid vaccine.              

- Social history:: Smoking status: Patient denies any tobacco usage or history of.                

                                                                                                  

                                                                                                  

Screenin:05 Abuse screen: Denies threats or abuse. Denies injuries from another. Nutritional        ph  

      screening: No deficits noted. Tuberculosis screening: No symptoms or risk factors           

      identified. Fall Risk None identified.                                                      

                                                                                                  

Assessment:                                                                                       

07:30 General: Appears in no apparent distress. obese, well groomed, Behavior is calm,        ph  

      cooperative, appropriate for age, Denies fever, feeling ill. Pain: Complains of pain in     

      chest Pain does not radiate. Quality of pain is described as squeezing. Neuro: Level of     

      Consciousness is awake, alert, obeys commands, Oriented to person, place, time,             

      situation. Cardiovascular: Reports chest pain, diaphoresis, lightheadedness,                

      palpitations, shortness of breath, Rhythm is atrial fibrillation with rapid ventricular     

      response Chest pain quality is squeezing, is located in substernal area. Respiratory:       

      Airway is patent Respiratory effort is even, unlabored. GI: No signs and/or symptoms        

      were reported involving the gastrointestinal system. Derm: Skin is intact, Skin is          

      clammy, Skin is normal, Skin temperature is cool. Musculoskeletal: Circulation, motion,     

      and sensation intact. Range of motion: intact in all extremities.                           

07:35 Reassessment: rate 160s-170s, ERP notified. Cardiovascular: Rhythm is atrial            ph  

      fibrillation with rapid ventricular response.                                               

07:40 Reassessment: Dr Olivera at bedside.                                                    ph  

08:40 Reassessment: Patient appears in no apparent distress at this time. Patient and/or      ph  

      family updated on plan of care and expected duration. Pain level reassessed. Patient is     

      alert, oriented x 3, equal unlabored respirations, skin warm/dry/pink. Rate decreased       

      to 140s-150s, pt reports that chest tightness is improving, awaiting results of covid       

      swab before receiving ICU bed assignment.                                                   

09:10 Reassessment: Patient appears in no apparent distress at this time. Patient and/or      ph  

      family updated on plan of care and expected duration. Pain level reassessed. Dr Root       

      at bedside to speak w/ pt.                                                                  

10:25 Reassessment: Report called to MARIELLE Banuelos, preparing pt to be taken to ICU.          ph  

                                                                                                  

Vital Signs:                                                                                      

07:32  / 101; Pulse 172; Resp 16; Pulse Ox 99% on R/A;                                  ph  

07:33  / 138; Pulse 105; Resp 20; Temp 98.6; Pulse Ox 100% ; Weight 181.44 kg; Height 5 ll1 

      ft. 8 in. (172.72 cm); Pain 6/10;                                                           

07:45  / 78; Pulse 163; Resp 18; Pulse Ox 100% on R/A;                                  ph  

08:00 BP 92 / 51; Pulse 152; Resp 16; Pulse Ox 98% on R/A;                                    ph  

08:15  / 90; Pulse 146; Resp 18; Pulse Ox 98% on R/A;                                   ph  

08:45  / 70; Pulse 133; Resp 16; Pulse Ox 99% on R/A;                                   ph  

09:15 BP 97 / 64; Pulse 129; Resp 16; Pulse Ox 98% on R/A;                                    ph  

09:35 BP 99 / 72; Pulse 127; Resp 16; Pulse Ox 98% on R/A;                                    ph  

09:52 BP 98 / 71; Pulse 122; Resp 18; Temp 97.5; Pulse Ox 95% on R/A;                         ph  

10:30  / 76; Pulse 141; Resp 18; Temp 97.8; Pulse Ox 96% on R/A;                        ph  

07:33 Body Mass Index 60.82 (181.44 kg, 172.72 cm)                                            ll1 

                                                                                                  

ED Course:                                                                                        

07:24 Patient arrived in ED.                                                                  rg4 

07:24 Aston Pizano MD is Private Physician.                                                 rg4 

07:26 [facility]                                                        kb  

07:26 Mauro Ledesma DO is Attending Physician.                                                kb  

07:32 Arm band placed on Patient placed in an exam room, on a stretcher.                      ll1 

07:34 Triage completed.                                                                       ll1 

07:38 EKG done, by ED staff, reviewed by Mauro Ledesma DO.                                      ss  

07:38 Basic Metabolic Panel Sent.                                                             mh5 

07:38 CBC with Diff Sent.                                                                     mh5 

07:38 Troponin HS Sent.                                                                       mh5 

07:39 Initial lab(s) drawn, by me, sent to lab. Inserted saline lock: 20 gauge in right       mh5 

      antecubital area, using aseptic technique. Blood collected.                                 

07:42 Laura Aparicio, RN is Primary Nurse.                                                    ph  

07:47 XRAY Chest (1 view) In Process Unspecified.                                             EDMS

08:13 Willi Root MD is Hospitalizing Provider.                                           kb  

08:14 Patient has correct armband on for positive identification. Bed in low position. Call   ph  

      light in reach. Side rails up X2. Cardiac monitor on. Pulse ox on. NIBP on. Door            

      closed. Noise minimized.                                                                    

08:31 No provider procedures requiring assistance completed. Patient admitted, IV remains in  ph  

      place. Patient maintains SpO2 saturation greater than 95% on room air.                      

08:33 Inserted saline lock: 20 gauge in left antecubital area, using aseptic technique.       ph  

                                                                                                  

Administered Medications:                                                                         

07:47 CANCELLED (Duplicate Order): amiodarone 900 mg, D5W 500 ml IVPB at 1 mg/min continuous; ph  

      for 6 hrs, then change to 0.5 mg/min                                                        

07:56 Drug: amiodarone 150 mg Volume: 100 ml; Route: IVPB; Infused Over: 10 mins; Site: right   

      antecubital;                                                                                

08:06 Follow up: Response: No adverse reaction; IV Status: Completed infusion                 ph  

08:13 Drug: NS 0.9% 1000 ml Route: IV; Rate: 1000 ml; Site: right antecubital;                ph  

09:33 Follow up: Response: No adverse reaction; IV Status: Completed infusion; IV Intake:     ph  

      1000ml                                                                                      

08:19 Drug: amiodarone 900 mg, D5W 500 ml Route: IVPB; Rate: 1 mg/min; Site: right              

      antecubital;                                                                                

09:33 Follow up: IV Status: Infusion continued upon admission                                 ph  

                                                                                                  

                                                                                                  

Intake:                                                                                           

09:33 IV: 1000ml; Total: 1000ml.                                                              ph  

                                                                                                  

Outcome:                                                                                          

08:16 Decision to Hospitalize by Provider.                                                    kb  

10:58 Admitted to ICU accompanied by nurse, accompanied by tech, family with patient, via     ph  

      stretcher, room 7, on monitor, with chart, Report called to  MARIELLE Banuelos                  

10:58 Condition: stable                                                                           

10:58 Instructed on the need for admit.                                                           

11:05 Patient left the ED.                                                                    ph  

                                                                                                  

Signatures:                                                                                       

Dispatcher [employer]                           EDMS                                                 

Rabia Bobo, BIN GARCÍA-Caitlin Villegas RN RN ss Hall, Patricia, RN RN                                                      

Loreto Sandoval                                 [facility]                                                  

Janette Bean                              [facility]                                                  

Eduardo Iraheta RN                       RN   ll1                                                  

                                                                                                  

Corrections: (The following items were deleted from the chart)                                    

08: 07:40 Reassessment: ph                                                                  ph  

08:26 07:40 Reassessment: Pt's cardiologist Dr Olivera at bedside, amiodarone ordered, pt to    

      be admitted to ICU Reassessment: Pt's cardiologist Dr Olivera at bedside, amiodarone        

      ordered, pt to be admitted to ICU ph                                                        

                                                                                                  

**************************************************************************************************

## 2022-03-31 VITALS — DIASTOLIC BLOOD PRESSURE: 73 MMHG | SYSTOLIC BLOOD PRESSURE: 110 MMHG

## 2022-03-31 VITALS — OXYGEN SATURATION: 98 %

## 2022-03-31 VITALS — TEMPERATURE: 99.2 F

## 2022-03-31 LAB
ALBUMIN SERPL BCP-MCNC: 3.2 G/DL (ref 3.4–5)
ALP SERPL-CCNC: 68 U/L (ref 45–117)
ALT SERPL W P-5'-P-CCNC: 28 U/L (ref 12–78)
AST SERPL W P-5'-P-CCNC: 16 U/L (ref 15–37)
BUN BLD-MCNC: 16 MG/DL (ref 7–18)
GLUCOSE SERPLBLD-MCNC: 116 MG/DL (ref 74–106)
HCT VFR BLD CALC: 42 % (ref 39.6–49)
LYMPHOCYTES # SPEC AUTO: 1.5 K/UL (ref 0.7–4.9)
MAGNESIUM SERPL-MCNC: 2.4 MG/DL (ref 1.8–2.4)
PMV BLD: 7.5 FL (ref 7.6–11.3)
POTASSIUM SERPL-SCNC: 4 MMOL/L (ref 3.5–5.1)
RBC # BLD: 4.76 M/UL (ref 4.33–5.43)

## 2022-03-31 RX ADMIN — METOPROLOL TARTRATE SCH MG: 50 TABLET, FILM COATED ORAL at 09:15

## 2022-03-31 RX ADMIN — ENOXAPARIN SODIUM SCH MG: 100 INJECTION SUBCUTANEOUS at 08:20

## 2022-03-31 NOTE — CON
Date of Consultation:  03/30/2022



Reason For Consultation:  Recurrent atrial fibrillation.



History Of Present Illness:  Mr. Sarah is a 41-year-old male.  He is known to me from previous of
fice visits.  He has had atrial fibrillation status post cardioversion in January of 2021.  He remain
ed in sinus rhythm on metoprolol and aspirin but had another episode of atrial fibrillation that star
viraj on 03/30/2022 in the morning when he woke up with a heart rate of 150-170.  He has a history of c
ongestive heart failure that resolved after his atrial fibrillation in the past.  Has a history of mo
rbid obesity, hypertension, left ventricular hypertrophy.  When he came in with atrial fibrillation, 
he was short of breath.  No nausea, vomiting, diaphoresis, PND, orthopnea, pedal edema, or syncope.  
He did have palpitation.  No fever.  No chills.



Allergies:  NONE.



Review of Systems:

Negative.



Social History:  Negative.



Family History:  Negative.



Medications:  At home include aspirin, Norvasc, losartan, Lasix and metoprolol.



Physical Examination:

General:  He was in atrial fibrillation with rapid ventricular response. 

Vital Signs:  Otherwise stable.  Afebrile.  Morbidly obese. 

HEENT:  Negative. 

Neck:  Supple with no bruit. 

Chest:  Clear. 

Cardiac:  Revealed atrial fibrillation. 

Abdomen:  Obese, but benign. 

Extremities:  Revealed no clubbing, cyanosis, or edema.



Diagnostic Data:  Normal except with atrial fibrillation.



Impression And Plan:  Recurrent atrial fibrillation.  We will put the patient on IV amiodarone.  If h
e does not convert in the morning, we will shock him.  We will give him Lovenox.  We will give him Xa
relto.  He will go home eventually on amiodarone and Xarelto and continue his other regimen.  His hyp
ertension is well controlled.





NB/MODL

DD:  03/31/2022 08:16:01Voice ID:  [id number]

DT:  03/31/2022 12:20:24Report ID:  [id number]

## 2022-03-31 NOTE — PN
Date of Progress Note:  03/31/2022



Mr. Sarah is being seen today on 03/31/2022 for recurrent atrial fibrillation.  He was at home on
 metoprolol and aspirin.  He has had cardioversion in January of 2021.  Has hypertension and chronic 
systolic congestive heart failure that has improved.  Came in with atrial fibrillation yesterday, had
 failed to convert to sinus rhythm despite IV amiodarone.  We will plan to continue Lovenox, continue
 Xarelto and do a cardioversion on him today.





NB/CARLOS

DD:  03/31/2022 08:17:38Voice ID:  [id number]

DT:  03/31/2022 12:39:44Report ID:  [id number]

## 2022-03-31 NOTE — OP
Date of Procedure:  03/31/2022



Surgeon:  Evan Olivera MD



Procedure:  Direct current cardioversion.



Indication:  Recurrent atrial fibrillation that failed amiodarone therapy.  The patient was in the 
U, at the time, he had come in 24 hours ago with atrial fibrillation despite metoprolol and aspirin. 
 He was given Xarelto, he was given Lovenox, he failed IV amiodarone, continued with a heart rate of 
130 to 150.  In the ICU, we gave him 10 mg of IV Lopressor push slowly.



Procedure In Detail:  He received a total of 50 of fentanyl.  He received a total of 12 mg of Versed 
IV push.  He received 1 shock of 200 joules and he converted to sinus rhythm.  There were no complica
tions or blood loss. 



Total conscious sedation was 15 minutes.



Final Diagnoses:  Atrial fibrillation, status post successful cardioversion to normal rhythm.  The pl
an is to keep him on metoprolol, switch him from aspirin to Xarelto 20 mg daily, put him on amiodaron
e 400 mg 1 p.o. b.i.d. for a week, after which he will be on 200 twice a day.  I will see him in the 
office in the next week or two.  He can go home after he wakes up today.





NB/CARLOS

DD:  03/31/2022 09:16:11Voice ID:  [id number]

DT:  03/31/2022 19:55:48Report ID:  [id number]

## 2022-03-31 NOTE — P.DS
Admission Date: 03/30/22


Discharge Date: 03/31/22


Disposition: ROUTINE DISCHARGE


Discharge Condition: GOOD


Reason for Admission: afib with RVR


Consultations: 





Cardiology - Dr. Olivera





Procedures: 





Problem List


Afib with RVR, h/o Afib


HTN


CHF, unknown type


LALITHA





Brief History of Present Illness: 





40yo M, PMH: Afib, LALITHA, HTN


Presents to ED due to palpitations, chest discomfort since this morning. H/o 

afib, previously cardioverted, no longer on anticoagulation. In his usual state 

of health up until this occurred this morning. Denies missing any medications, 

no recent change in medications. In the ED, he was noted to be in afib with RVR 

to 170s. Cardiology consulted and patient started on amio drip with improvement.





Hospital Course: 


Patient was found to be in rapid atrial fibrillation. Heart rate improved with 

amiodarone. 


Cardiology was consulted and patient underwent electrical cardioversion 

successfully.


Discharged home with prescriptions for amiodarone (400mg twice daily x 7 days, 

then 200mg twice daily) and Xarelto.





Vital Signs/Physical Exam: 














Temp Pulse Resp BP Pulse Ox


 


 99.2 F   68   10 L  107/82   91 


 


 03/31/22 08:00  03/31/22 10:00  03/31/22 10:00  03/31/22 10:00  03/31/22 10:00








Physical Exam


General: AOx3, NAD


Respiratory: Clear to auscultation bilaterally, Normal air movement


Cardiovascular: No edema, regular rate and rhythm


Gastrointestinal: Soft and benign, Non-distended, No tenderness


Integumentary: No rashes, No significant lesion


Neurological: Normal speech, Normal strength at 5/5 x4 extr, Normal affect





Laboratory Data at Discharge: 














WBC  7.0 K/uL (4.3-10.9)   03/31/22  04:42    


 


Hgb  14.3 g/dL (13.6-17.9)   03/31/22  04:42    


 


Hct  42.0 % (39.6-49.0)   03/31/22  04:42    


 


Plt Count  244 K/uL (152-406)   03/31/22  04:42    


 


Sodium  141 mmol/L (136-145)   03/31/22  04:42    


 


Potassium  4.0 mmol/L (3.5-5.1)   03/31/22  04:42    


 


BUN  16 mg/dL (7-18)   03/31/22  04:42    


 


Creatinine  0.75 mg/dL (0.55-1.3)   03/31/22  04:42    


 


Glucose  116 mg/dL ()  H  03/31/22  04:42    


 


Magnesium  2.4 mg/dL (1.8-2.4)   03/31/22  04:42    


 


Total Bilirubin  1.3 mg/dL (0.2-1.0)  H  03/31/22  04:42    


 


AST  16 U/L (15-37)   03/31/22  04:42    


 


ALT  28 U/L (12-78)   03/31/22  04:42    


 


Alkaline Phosphatase  68 U/L ()   03/31/22  04:42    








Home Medications: 








Potassium Chloride [Micro-K] 10 meq PO DAILY 12/08/20 


Furosemide [Lasix*] 40 mg PO BID #60 tab 12/10/20 


Aspirin [Aspirin EC 81 MG] 81 mg PO DAILY 03/30/22 


Losartan Potassium 50 mg PO DAILY 03/30/22 


Metoprolol Succinate 50 mg PO BID 03/30/22 


Amiodarone HCl [Pacerone] 200 mg PO SEECOM 30 Days #74 tablet 03/31/22 


Rivaroxaban [Xarelto] 20 mg PO DAILY AT SUPPER 30 Days #30 tablet 03/31/22 





New Medications: 


Amiodarone HCl [Pacerone] 200 mg PO SEECOM 30 Days #74 tablet


Rivaroxaban [Xarelto] 20 mg PO DAILY AT SUPPER 30 Days #30 tablet


Physician Discharge Instructions: 


PROBLEM: Atrial Fibrillation





GOAL: Clear understanding of disease process





INSTRUCTIONS:Patient was found to be in rapid atrial fibrillation. Heart rate 

improved with amiodarone. Cardiology was consulted and patient underwent 

electrical cardioversion successfully.


Discharged home with prescriptions for amiodarone (400mg twice daily x 7 days, 

then 200mg twice daily) and Xarelto.








Diet: AHA





Activity: Ad oumou





DME


DME:


Date Ordered: 


Name of Company: 





[employer]


Services Needed: 


Name of Company: 


Date or Referral: 





IMMUNIZATION


Influenza Vaccine Indicated: Yes


Influenza Vaccine Given: 


Date Given: 





Pneumonia Vaccine Indicated: Yes


Pneumonia Vaccine Given: 


Date Given: 





Follow up with a Cardiologist of your choice: Dr. Olivera in 1-2 weeks





[facility]EW[facility] A[facility], [address] [place] [address] [phone]   


Fax [phone]








RENA[facility] ALEX, [address] [address] [phone]


Fax [phone]





Diet: AHA


Activity: Ad oumou


Followup: 


Aston Pizano MD [Primary Care Provider] - 


Time spent managing pt's care (in minutes): 45

## 2022-04-04 NOTE — EKG
Test Date:    2022-03-30               Test Time:    07:35:18

Technician:   PH                                     

                                                     

MEASUREMENT RESULTS:                                       

Intervals:                                           

Rate:         172                                    

CT:                                                  

QRSD:         86                                     

QT:           266                                    

QTc:          449                                    

Axis:                                                

P:                                                   

CT:                                                  

QRS:          31                                     

T:            -21                                    

                                                     

INTERPRETIVE STATEMENTS:                                       

                                                     

Atrial fibrillation with rapid ventricular response

Nonspecific ST abnormality, probably digitalis effect

Abnormal ECG

Compared to ECG 01/25/2021 08:06:56

ST (T wave) deviation now present

Sinus rhythm no longer present

Incomplete right bundle-branch block no longer present

Prolonged QT interval no longer present



Electronically Signed On 04-04-22 11:14:58 CDT by Evan Olivera

## 2022-04-04 NOTE — EKG
Test Date:    2022-03-31               Test Time:    07:45:59

Technician:   KIANA                                     

                                                     

MEASUREMENT RESULTS:                                       

Intervals:                                           

Rate:         68                                     

WV:           176                                    

QRSD:         104                                    

QT:           398                                    

QTc:          423                                    

Axis:                                                

P:            46                                     

WV:           176                                    

QRS:          18                                     

T:            19                                     

                                                     

INTERPRETIVE STATEMENTS:                                       

                                                     

Normal sinus rhythm

Normal ECG

Compared to ECG 03/30/2022 07:35:18

Atrial fibrillation no longer present

ST (T wave) deviation no longer present



Electronically Signed On 04-04-22 11:14:22 CDT by Evan Olivera

## 2022-04-04 NOTE — EKG
Test Date:    2022-03-31               Test Time:    08:34:17

Technician:   BALBIR                                     

                                                     

MEASUREMENT RESULTS:                                       

Intervals:                                           

Rate:         74                                     

MT:           178                                    

QRSD:         102                                    

QT:           402                                    

QTc:          446                                    

Axis:                                                

P:            66                                     

MT:           178                                    

QRS:          48                                     

T:            55                                     

                                                     

INTERPRETIVE STATEMENTS:                                       

                                                     

Normal sinus rhythm

Normal ECG

Compared to ECG 03/30/2022 07:35:18

Atrial fibrillation no longer present

ST (T wave) deviation no longer present



Electronically Signed On 04-04-22 11:14:20 CDT by Evan Olivera

## 2022-12-21 ENCOUNTER — HOSPITAL ENCOUNTER (EMERGENCY)
Dept: HOSPITAL 97 - ER | Age: 42
Discharge: ANOTHER HOSPITAL | End: 2022-12-21
Payer: COMMERCIAL

## 2022-12-21 VITALS — OXYGEN SATURATION: 100 %

## 2022-12-21 VITALS — SYSTOLIC BLOOD PRESSURE: 128 MMHG | TEMPERATURE: 98 F | DIASTOLIC BLOOD PRESSURE: 75 MMHG

## 2022-12-21 DIAGNOSIS — R10.13: ICD-10-CM

## 2022-12-21 DIAGNOSIS — Z20.822: ICD-10-CM

## 2022-12-21 DIAGNOSIS — I50.9: ICD-10-CM

## 2022-12-21 DIAGNOSIS — I10: ICD-10-CM

## 2022-12-21 DIAGNOSIS — Z79.82: ICD-10-CM

## 2022-12-21 DIAGNOSIS — R93.5: ICD-10-CM

## 2022-12-21 DIAGNOSIS — I95.9: Primary | ICD-10-CM

## 2022-12-21 LAB
ALBUMIN SERPL BCP-MCNC: 3.5 G/DL (ref 3.4–5)
ALP SERPL-CCNC: 70 U/L (ref 45–117)
ALT SERPL W P-5'-P-CCNC: 32 U/L (ref 16–61)
AST SERPL W P-5'-P-CCNC: 16 U/L (ref 15–37)
BUN BLD-MCNC: 20 MG/DL (ref 7–18)
GLUCOSE SERPLBLD-MCNC: 131 MG/DL (ref 74–106)
HCT VFR BLD CALC: 43.4 % (ref 39.6–49)
INR BLD: 1.83
LYMPHOCYTES # SPEC AUTO: 0.7 K/UL (ref 0.7–4.9)
MAGNESIUM SERPL-MCNC: 1.9 MG/DL (ref 1.6–2.4)
MCV RBC: 89.8 FL (ref 80–100)
NT-PROBNP SERPL-MCNC: 11 PG/ML (ref ?–125)
PMV BLD: 7.5 FL (ref 7.6–11.3)
POTASSIUM SERPL-SCNC: 3.6 MMOL/L (ref 3.5–5.1)
RBC # BLD: 4.83 M/UL (ref 4.33–5.43)
TROPONIN I SERPL HS-MCNC: 6.2 PG/ML (ref ?–58.9)

## 2022-12-21 PROCEDURE — 80076 HEPATIC FUNCTION PANEL: CPT

## 2022-12-21 PROCEDURE — 83880 ASSAY OF NATRIURETIC PEPTIDE: CPT

## 2022-12-21 PROCEDURE — 80048 BASIC METABOLIC PNL TOTAL CA: CPT

## 2022-12-21 PROCEDURE — 85610 PROTHROMBIN TIME: CPT

## 2022-12-21 PROCEDURE — 83735 ASSAY OF MAGNESIUM: CPT

## 2022-12-21 PROCEDURE — 87811 SARS-COV-2 COVID19 W/OPTIC: CPT

## 2022-12-21 PROCEDURE — 36415 COLL VENOUS BLD VENIPUNCTURE: CPT

## 2022-12-21 PROCEDURE — 84484 ASSAY OF TROPONIN QUANT: CPT

## 2022-12-21 PROCEDURE — 85025 COMPLETE CBC W/AUTO DIFF WBC: CPT

## 2022-12-21 PROCEDURE — 93005 ELECTROCARDIOGRAM TRACING: CPT

## 2022-12-21 PROCEDURE — 74177 CT ABD & PELVIS W/CONTRAST: CPT

## 2022-12-21 PROCEDURE — 71045 X-RAY EXAM CHEST 1 VIEW: CPT

## 2022-12-21 NOTE — EDPHYS
Physician Documentation                                                                           

 [facility]                                                                 

Name: Chinyere Sarah III                                                                       

Age: 42 yrs                                                                                       

Sex: Male                                                                                         

: 1980                                                                                   

MRN: [id number]                                                                                   

Arrival Date: 2022                                                                          

Time: 14:19                                                                                       

Account#: [id number]                                                                            

Bed 15                                                                                            

Private MD:                                                                                       

ED Physician Jesus Manuel Meyer                                                                     

HPI:                                                                                              

                                                                                             

18:38 This 42 yrs old  Male presents to ER via EMS with complaints of Syncope.        rt  

18:38 The patient has experienced syncope. Onset: The symptoms/episode began/occurred just    rt  

      prior to arrival. Duration: This was a single episode. Associated injury: The patient       

      did not suffer any apparent associated injury. Associated signs and symptoms: Pertinent     

      positives: abdominal pain, nausea. Presents to the ED with a syncopal event. This           

      occurred acutely. The patient has an associated generalized abdominal pain. He has          

      nausea. Pain is aching nature, nonradiating. Denies other acute complaints at this          

      time, symptoms are severe in severity, no other aggravating or alleviating factors..        

                                                                                                  

Historical:                                                                                       

- Allergies:                                                                                      

14:21 No Known Allergies;                                                                     bp  

- Home Meds:                                                                                      

14:21 amlodipine 10 mg tab 1 tab once daily [Active]; aspirin 81 mg Oral tab 81 mg daily      bp  

      [Active]; furosemide 40 mg Oral tab 1 tab 2 times per day [Active]; losartan 50 mg Oral     

      tab 1 tab once daily [Active]; metoprolol succinate 50 mg Oral Tb24 1 tab twice a day       

      [Active]; metoprolol tartrate 25 mg Oral tab 1 tab once daily [Active]; potassium           

      chloride 10 mEq Oral cpER 1 cap once daily [Active];                                        

14:21 MOUNJARO WEEKLY [Active];                                                               bp  

- PMHx:                                                                                           

14:21 CHF; Hypertension;                                                                      bp  

14:21 Atrial fibrillation;                                                                    bp  

                                                                                                  

- Immunization history:: Adult Immunizations up to date.                                          

- Social history:: Smoking status: Patient denies any tobacco usage or history of.                

- Family history:: not pertinent.                                                                 

                                                                                                  

                                                                                                  

ROS:                                                                                              

18:38 Constitutional: Negative for fever, chills, and weight loss, Eyes: Negative for injury, rt  

      pain, redness, and discharge, ENT: Negative for injury, pain, and discharge, Neck:          

      Negative for injury, pain, and swelling, Respiratory: Negative for shortness of breath,     

      cough, wheezing, and pleuritic chest pain, Back: Negative for injury and pain,              

      MS/Extremity: Negative for injury and deformity, Skin: Negative for injury, rash, and       

      discoloration, Psych: Negative for depression, anxiety, suicide ideation, homicidal         

      ideation, and hallucinations.                                                               

18:38 Abdomen/GI: Positive for abdominal pain, nausea.                                            

18:38 Neuro: Positive for syncope, Negative for altered mental status.                            

                                                                                                  

Exam:                                                                                             

18:38 Head/Face:  Normocephalic, atraumatic. Eyes:  Pupils equal round and reactive to light, rt  

      extra-ocular motions intact.  Lids and lashes normal.  Conjunctiva and sclera are           

      non-icteric and not injected.  Cornea within normal limits.  Periorbital areas with no      

      swelling, redness, or edema. ENT:  Nares patent. No nasal discharge, no septal              

      abnormalities noted.  Tympanic membranes are normal and external auditory canals are        

      clear.  Oropharynx with no redness, swelling, or masses, exudates, or evidence of           

      obstruction, uvula midline.  Mucous membranes moist. Neck:  Trachea midline, no             

      thyromegaly or masses palpated, and no cervical lymphadenopathy.  Supple, full range of     

      motion without nuchal rigidity, or vertebral point tenderness.  No Meningismus.             

      Chest/axilla:  Normal chest wall appearance and motion.  Nontender with no deformity.       

      No lesions are appreciated. Cardiovascular:  Regular rate and rhythm with a normal S1       

      and S2.  No gallops, murmurs, or rubs.  Normal PMI, no JVD.  No pulse deficits.             

      Respiratory:  Lungs have equal breath sounds bilaterally, clear to auscultation and         

      percussion.  No rales, rhonchi or wheezes noted.  No increased work of breathing, no        

      retractions or nasal flaring. MS/ Extremity:  Pulses equal, no cyanosis.  Neurovascular     

      intact.  Full, normal range of motion. Neuro:  Awake and alert, GCS 15, oriented to         

      person, place, time, and situation.  Cranial nerves II-XII grossly intact.  Motor           

      strength 5/5 in all extremities.  Sensory grossly intact.  Cerebellar exam normal.          

      Normal gait. Psych:  Awake, alert, with orientation to person, place and time.              

      Behavior, mood, and affect are within normal limits.                                        

18:38 Constitutional: The patient appears Pale, diaphoretic, in acute distress                    

18:38 ECG was reviewed by the Attending Physician.                                                

18:38 Abdomen/GI: Tenderness diffusely without rebound, guarding, distention.                     

18:38 Skin: Pale, diaphoretic.                                                                    

                                                                                                  

Vital Signs:                                                                                      

14:19  / 70; Pulse 80; Resp 16; Temp 97.3; Pulse Ox 96% on R/A; Weight 181.44 kg;       bp  

      Height 5 ft. 7 in. (170.18 cm);                                                             

15:00  / 67; Pulse 52; Resp 15; Pulse Ox 95% ;                                          bp  

16:00  / 87; Pulse 43; Resp 17; Pulse Ox 97% ;                                          bp  

17:00  / 69; Pulse 59; Resp 15; Pulse Ox 96% ;                                          bp  

18:00  / 81; Pulse 57; Resp 13; Pulse Ox 100% ;                                         bp  

19:48  / 75; Pulse 61; Resp 16; Temp 98(O); Pulse Ox 100% on 4 lpm NC; Pain 0/10;       ke1 

21:37 Pain 0/10;                                                                              ke1 

14:19 Body Mass Index 62.65 (181.44 kg, 170.18 cm)                                            bp  

                                                                                                  

MDM:                                                                                              

14:30 Patient medically screened.                                                             rt  

18:38 Differential Diagnosis: aortic aneurysm, GI bleed, idiopathic syncope, sepsis,          rt  

      transient ischemic attack, vasovagal episode. Data reviewed: vital signs, nurses notes,     

      old medical records, lab test result(s), EKG, radiologic studies. Response to               

      treatment: the patient's symptoms have markedly improved after treatment.                   

23:00 ED course: I was informed that GI and General Surgery recommend transfer for this       sd2 

      patient rather than admission to our facility due to his bariatric surgery in the past      

      and possible issue with his gastric band. Case discussed with hospitalist, Dr. Fields,       

      and Dr. Hollins, Bariatric Surgery, at [facility] who accepted the          

      patient for transfer. Pt is currently stable for transfer. .                                

                                                                                                  

                                                                                             

14:39 Order name: Basic Metabolic Panel; Complete Time: 15:35                                 rt  

                                                                                             

14:39 Order name: CBC with Diff; Complete Time: 15:35                                         rt  

                                                                                             

14:39 Order name: Magnesium; Complete Time: 15:35                                             rt  

                                                                                             

14:39 Order name: NT PRO-BNP; Complete Time: 15:35                                            rt  

                                                                                             

14:39 Order name: PT-INR; Complete Time: 15:35                                                rt  

                                                                                             

14:39 Order name: Troponin HS; Complete Time: 15:35                                           rt  

                                                                                             

14:39 Order name: XRAY Chest (1 view); Complete Time: 16:13                                   rt  

                                                                                             

14:39 Order name: CT Abd/Pelvis - IV Contrast Only; Complete Time: 16:13                      rt  

                                                                                             

18:06 Order name: LFT's; Complete Time: 19:38                                                 la1 

                                                                                             

19:41 Order name: SARS RAPID; Complete Time: 21:24                                            ke1 

                                                                                             

14:39 Order name: EKG; Complete Time: 14:40                                                   rt  

                                                                                             

14:39 Order name: Cardiac monitoring; Complete Time: 14:48                                    rt  

                                                                                             

14:39 Order name: EKG - Nurse/Tech; Complete Time: 14:55                                      rt  

                                                                                             

14:39 Order name: IV Saline Lock; Complete Time: 14:48                                        rt  

                                                                                             

14:39 Order name: Labs collected and sent; Complete Time: 14:55                               rt  

                                                                                             

14:39 Order name: O2 Per Protocol; Complete Time: 14:48                                       rt  

                                                                                             

14:39 Order name: O2 Sat Monitoring; Complete Time: 14:48                                     rt  

                                                                                                  

EC:38 Rate is 52 beats/min. Rhythm is regular, Sinus bradycardia with No ectopy. QRS Axis is  rt  

      Normal. AK interval is normal. QRS interval is normal. QT interval is normal. No Q          

      waves. Clinical impression: NSR w/ Non-specific ST/T Changes.                               

                                                                                                  

Administered Medications:                                                                         

14:48 Drug: NS 0.9% 1000 ml Route: IV; Rate: 1 bolus; Site: left antecubital;                 bp  

15:30 Drug: fentaNYL (PF) 50 mcg Route: IVP; Site: left antecubital;                          bp  

15:30 Drug: Zofran (Ondansetron) 4 mg Route: IVP; Site: left antecubital;                     bp  

17:10 Drug: fentaNYL (PF) 100 mcg Route: IVP; Site: left antecubital;                         bp  

18:34 Not Given (Patient Refused): Reglan (metoCLOPramide) 10 mg IVP once; over 1 to 2 minutesbp  

18:34 Not Given (Patient Refused): Benadryl (diphenhydrAMINE) 25 mg IVP once                  bp  

21:22 Drug: morphine 4 mg Route: IVP; Infused Over: 4 mins; Site: left antecubital;           ke1 

21:37 Follow up: Pain 0/10 Adult; Response: Pain is decreased                                 ke1 

22:12 Drug: Banana Bag - (NS 0.9% 1000 ml, foLIC Acid 1 mg, Thiamine 100 mg, Multivitamin 1   ke1 

      amp) Route: IV; Rate: calculated rate; Site: left antecubital;                              

                                                                                                  

                                                                                                  

Disposition Summary:                                                                              

22 21:44                                                                                    

Transfer Ordered                                                                                  

      Transfer Location: [facility]                                              sd2 

      Reason: Higher level of care                                                            sd2 

      Condition: Stable(22 21:44)                                                       sd2 

      Problem: new(22 21:44)                                                            sd2 

      Symptoms: have improved(22 21:44)                                                 sd2 

      Accepting Physician: Dr. Fields(22 23:22)                                          ke1 

      Diagnosis                                                                                   

        - Syncope                                                                             sd2 

        - Hypotension                                                                         sd2 

        - Epigastric abdominal pain                                                           sd2 

        - Abnormal CT scan of abdomen                                                         sd2 

      Forms:                                                                                      

        - Medication Reconciliation Form                                                      sd2 

        - SBAR form                                                                           sd2 

Critical care time excluding procedures:                                                          

18:38 Critical care time: Bedside Care: 30 minutes, Consultation: 5 minutes. Total time: 35   rt  

      minutes                                                                                     

                                                                                                  

Signatures:                                                                                       

Dispatcher [employer]                           EDMS                                                 

Avinash Walsh, RICKY-C                      FNP-Cla1                                                  

Alfonzo Joe, RN                      RN   bp                                                   

Nisha Herring RN                   RN   ke1                                                  

Radha Contreras MD MD   sd2                                                  

Jesus Manuel Meyer MD MD   rt                                                   

                                                                                                  

Corrections: (The following items were deleted from the chart)                                    

18:16 18:14 Rm Chambers rt                                                                  la1 

21:43 18:14 Observation rt                                                                    sd2 

21:43 18:14 Telemetry/MedSurg (observation) rt                                                sd2 

21:43 18:14 Fair rt                                                                           sd2 

21:43 18:14 new rt                                                                            sd2 

21:43 18:14 have improved rt                                                                  sd2 

21:43 18:14 Standard rt                                                                       sd2 

21:43 18:14 rt                                                                                sd2 

21:43 18:14 Syncope rt                                                                        sd2 

21:43 18:14 Hypotension, unspecified rt                                                       sd2 

21:43 18:14 Other abdominal pain rt                                                           sd2 

21:43 18:16 Willi Root1                                                                sd2 

22:46 21:44 Dr. Vernon sd2                                                                     sd2 

23:22 22:46 Dr. Fields sd2                                                                     ke1 

                                                                                                  

**************************************************************************************************

## 2022-12-21 NOTE — ER
Nurse's Notes                                                                                     

 Formerly [facility]                                                                 

Name: Chinyere Sarah III                                                                       

Age: 42 yrs                                                                                       

Sex: Male                                                                                         

: 1980                                                                                   

MRN: [id number]                                                                                   

Arrival Date: 2022                                                                          

Time: 14:19                                                                                       

Account#: [id number]                                                                            

Bed 15                                                                                            

Private MD:                                                                                       

Diagnosis: Syncope;Hypotension;Epigastric abdominal pain;Abnormal CT scan of abdomen              

                                                                                                  

Presentation:                                                                                     

                                                                                             

14:19 Chief complaint: EMS states: SYNCOPE WITHOUT FALL. Coronavirus screen: At this time,    bp  

      the client does not indicate any symptoms associated with coronavirus-19. Ebola Screen:     

      No symptoms or risks identified at this time. Initial Sepsis Screen: Does the patient       

      meet any 2 criteria? No. Patient's initial sepsis screen is negative. Does the patient      

      have a suspected source of infection? No. Patient's initial sepsis screen is negative.      

      Risk Assessment: Do you want to hurt yourself or someone else? Patient reports no           

      desire to harm self or others. Onset of symptoms was 2022 at 13:45. Care       

      prior to arrival: IV initiated. 20 GA, in the left antecubital area, Glucose check: 124.    

14:19 Acuity: YOSEF 3                                                                           bp  

14:19 Method Of Arrival: EMS: [facility]                                                    bp  

                                                                                                  

Triage Assessment:                                                                                

14:20 General: Appears distressed, uncomfortable, obese, Behavior is cooperative, appropriate bp  

      for age, anxious. Pain: Denies pain. EENT: No deficits noted. Neuro: Reports a syncopal     

      episode. Cardiovascular: Reports lightheadedness. Respiratory: No deficits noted. GI:       

      No signs and/or symptoms were reported involving the gastrointestinal system. : No        

      signs and/or symptoms were reported regarding the genitourinary system. Derm: No signs      

      and/or symptoms reported regarding the dermatologic system. Musculoskeletal: No             

      deficits noted.                                                                             

                                                                                                  

Historical:                                                                                       

- Allergies:                                                                                      

14:21 No Known Allergies;                                                                     bp  

- Home Meds:                                                                                      

14:21 amlodipine 10 mg tab 1 tab once daily [Active]; aspirin 81 mg Oral tab 81 mg daily      bp  

      [Active]; furosemide 40 mg Oral tab 1 tab 2 times per day [Active]; losartan 50 mg Oral     

      tab 1 tab once daily [Active]; metoprolol succinate 50 mg Oral Tb24 1 tab twice a day       

      [Active]; metoprolol tartrate 25 mg Oral tab 1 tab once daily [Active]; potassium           

      chloride 10 mEq Oral cpER 1 cap once daily [Active];                                        

14:21 MOUNJARO WEEKLY [Active];                                                               bp  

- PMHx:                                                                                           

14:21 CHF; Hypertension;                                                                      bp  

14:21 Atrial fibrillation;                                                                    bp  

                                                                                                  

- Immunization history:: Adult Immunizations up to date.                                          

- Social history:: Smoking status: Patient denies any tobacco usage or history of.                

- Family history:: not pertinent.                                                                 

                                                                                                  

                                                                                                  

Screenin:20 [facility] ED Fall Risk Assessment (Adult) History of falling in the last 3 months,       bp  

      including since admission Yes- physiologic fall (2 pts) Confusion or Disorientation No      

      (0 pts) Intoxicated or Sedated No (0 pts) Impaired Gait No (0 pts) Mobility Assist          

      Device Used No (0 pt) Altered Elimination No (0 pt) Score/Fall Risk Level 0 - 2 = Low       

      Risk Oriented to surroundings, Maintained a safe environment, Educated pt \T\ family on     

      fall prevention, incl call for assistance when getting out of bed, Assessed \T\             

      reinforced patient's understanding of fall precautions, Hourly rounding (assess needs \T\   

      fall precautionary measures) done. Abuse screen: Denies threats or abuse. Denies            

      injuries from another. Nutritional screening: No deficits noted. Tuberculosis               

      screening: No symptoms or risk factors identified.                                          

                                                                                                  

Assessment:                                                                                       

14:20 General: SEE TRIAGE NOTE. PT PALE, DIAPHORETIC AND LIGHT-HEADED. PLACED IN MILD         bp  

      TRENDELENBERG AND MD NOTIFIED.                                                              

15:30 Reassessment: PT TO CT.                                                                 bp  

15:30 Neuro: Level of Consciousness is alert, obeys commands, lethargic, Oriented to          bp  

      Appropriate for age.                                                                        

16:00 Reassessment: PT C/O ABD PAIN, MD NOTIFIED. Cardiovascular: Rhythm is sinus bradycardia.bp  

18:00 Reassessment: PT INSIST ON AMBULATING TO RESTROOM, +STEADY GAIT Patient states symptoms bp  

      have improved.                                                                              

21:15 Pain: Complains of pain in abdomen Pain currently is 7 out of 10 on a pain scale.       ke1 

                                                                                                  

Vital Signs:                                                                                      

14:19  / 70; Pulse 80; Resp 16; Temp 97.3; Pulse Ox 96% on R/A; Weight 181.44 kg;       bp  

      Height 5 ft. 7 in. (170.18 cm);                                                             

15:00  / 67; Pulse 52; Resp 15; Pulse Ox 95% ;                                          bp  

16:00  / 87; Pulse 43; Resp 17; Pulse Ox 97% ;                                          bp  

17:00  / 69; Pulse 59; Resp 15; Pulse Ox 96% ;                                          bp  

18:00  / 81; Pulse 57; Resp 13; Pulse Ox 100% ;                                         bp  

19:48  / 75; Pulse 61; Resp 16; Temp 98(O); Pulse Ox 100% on 4 lpm NC; Pain 0/10;       ke1 

21:37 Pain 0/10;                                                                              ke1 

14:19 Body Mass Index 62.65 (181.44 kg, 170.18 cm)                                            bp  

                                                                                                  

ED Course:                                                                                        

14:19 Patient arrived in ED.                                                                  bp  

14:20 Triage completed.                                                                       bp  

14:20 Arm band placed on.                                                                     bp  

14:20 Patient has correct armband on for positive identification. Bed in low position. Call   bp  

      light in reach. Side rails up X2. Client placed on continuous cardiac and pulse             

      oximetry monitoring. NIBP monitoring applied.                                               

14:20 Maintain EMS IV. Dressing intact. Good blood return noted. Site clean \T\ dry. Gauge \T\    bp

      site: 20 GAUGE LEFT AC.                                                                     

14:27 Jesus Manuel Meyer MD is Attending Physician.                                            rt  

14:42 Alfonzo Joe, RN is Primary Nurse.                                                    bp  

15:12 XRAY Chest (1 view) In Process Unspecified.                                             EDMS

15:41 CT Abd/Pelvis - IV Contrast Only In Process Unspecified.                                EDMS

18:13 Rm Chambers MD is Hospitalizing Provider.                                            rt  

18:16 Willi Root MD is Hospitalizing Provider.                                           la1 

20:16 SARS RAPID Sent.                                                                        ke1 

20:34 Initiated transfer to [facility], spoke with Leisa.                             wm  

21:10 [facility] denied due to capacity, initiated for [facility].                  wm  

22:27 Pt accepted for transfer to [facility] by Donnie Nichole.                 wm  

23:22 No provider procedures requiring assistance completed. Patient transferred, IV remains  ke1 

      in place.                                                                                   

                                                                                                  

Administered Medications:                                                                         

14:48 Drug: NS 0.9% 1000 ml Route: IV; Rate: 1 bolus; Site: left antecubital;                 bp  

15:30 Drug: fentaNYL (PF) 50 mcg Route: IVP; Site: left antecubital;                          bp  

15:30 Drug: Zofran (Ondansetron) 4 mg Route: IVP; Site: left antecubital;                     bp  

17:10 Drug: fentaNYL (PF) 100 mcg Route: IVP; Site: left antecubital;                         bp  

18:34 Not Given (Patient Refused): Reglan (metoCLOPramide) 10 mg IVP once; over 1 to 2 minutesbp  

18:34 Not Given (Patient Refused): Benadryl (diphenhydrAMINE) 25 mg IVP once                  bp  

21:22 Drug: morphine 4 mg Route: IVP; Infused Over: 4 mins; Site: left antecubital;           ke1 

21:37 Follow up: Pain 0/10 Adult; Response: Pain is decreased                                 ke1 

22:12 Drug: Banana Bag - (NS 0.9% 1000 ml, foLIC Acid 1 mg, Thiamine 100 mg, Multivitamin 1   ke1 

      amp) Route: IV; Rate: calculated rate; Site: left antecubital;                              

                                                                                                  

                                                                                                  

Medication:                                                                                       

14:20 VIS not applicable for this client.                                                     bp  

                                                                                                  

Outcome:                                                                                          

18:14 Decision to Hospitalize by Provider.                                                    rt  

21:44 ER care complete, transfer ordered by MD.                                               sd2 

23:22 Transferred by ground EMS                                                               ke1 

23:22 Condition: good                                                                             

23:22 Instructed on the need for transfer.                                                        

23:22 Patient left the ED.                                                                    ke1 

                                                                                                  

Signatures:                                                                                       

Dispatcher [employer]                           EDMS                                                 

Avinash Walsh, FNP-C                      FNP-Cla1                                                  

Alfonzo Joe RN RN bp Marsh, Wendy wm Ebrottie, Kouassi, RN                   RN   ke1                                                  

Radha Contreras MD MD   sd2                                                  

Jesus Manuel Meyer MD MD   rt                                                   

                                                                                                  

Corrections: (The following items were deleted from the chart)                                    

14:47 14:19  / 70; Pulse 80bpm; Resp 16bpm; Pulse Ox 96% RA; bp                         bp  

                                                                                                  

**************************************************************************************************

## 2022-12-21 NOTE — RAD REPORT
EXAM DESCRIPTION:  CT - Abdomen   Pelvis W Contrast - 12/21/2022 3:39 pm

 

CLINICAL HISTORY:  Abdominal pain

 

COMPARISON:  2018

 

TECHNIQUE:  Computed axial tomography of the abdomen pelvis was obtained. 100 cc Isovue-300 was admin
istered intravenously. Oral contrast was not requested which limits evaluation of bowel and appendix

 

All CT scans are performed using dose optimization technique as appropriate and may include automated
 exposure control or mA/KV adjustment according to patient size.

 

FINDINGS:  Gastric band in place. The stomach is moderately to markedly distended.

 

The liver, pancreas, adrenal and kidneys appear unremarkable.

 

16 millimeter low-density splenic lesion.

 

There is no evidence of diverticulitis. Normal appendix

 

Small amount of free fluid within the pelvis

 

Mild anterior subluxation L5 on S1. Spondylosis L5

 

IMPRESSION:  Moderate to marked gastric distention

 

Small amount of free fluid within the pelvis

## 2022-12-21 NOTE — RAD REPORT
EXAM DESCRIPTION:  [facility] Single View12/21/2022 3:10 pm

 

CLINICAL HISTORY:  Hypertension

 

COMPARISON:  March 2022

 

FINDINGS:   The lungs appear clear of acute infiltrate. The heart is mildly to moderately enlarged

 

IMPRESSION:   No acute abnormalities displayed

## 2024-10-05 ENCOUNTER — HOSPITAL ENCOUNTER (EMERGENCY)
Dept: HOSPITAL 97 - ER | Age: 44
Discharge: HOME | End: 2024-10-05
Payer: SELF-PAY

## 2024-10-05 VITALS — DIASTOLIC BLOOD PRESSURE: 93 MMHG | OXYGEN SATURATION: 94 % | SYSTOLIC BLOOD PRESSURE: 153 MMHG

## 2024-10-05 DIAGNOSIS — T16.1XXA: Primary | ICD-10-CM

## 2024-10-05 PROCEDURE — 99283 EMERGENCY DEPT VISIT LOW MDM: CPT

## 2024-10-05 NOTE — ER
Nurse's Notes                                                                                     

 [facility]                                                                 

Name: Chinyere Sarah III                                                                       

Age: 44 yrs                                                                                       

Sex: Male                                                                                         

: 1980                                                                                   

MRN: [id number]                                                                                   

Arrival Date: 10/05/2024                                                                          

Time: 06:54                                                                                       

Account#: [id number]                                                                            

Bed 6                                                                                             

Private MD:                                                                                       

Diagnosis: Foreign body in right ear                                                              

                                                                                                  

Presentation:                                                                                     

10/05                                                                                             

07:00 Chief complaint: Patient states: Broke q-tip off in right ear this morning. Coronavirus jl7 

      screen: At this time, the client does not indicate any symptoms associated with             

      coronavirus-19. Ebola Screen: No symptoms or risks identified at this time. Initial         

      Sepsis Screen: Does the patient meet any 2 criteria? No. Patient's initial sepsis           

      screen is negative. Does the patient have a suspected source of infection? No.              

      Patient's initial sepsis screen is negative. Risk Assessment: Do you want to hurt           

      yourself or someone else? Patient reports no desire to harm self or others. Onset of        

      symptoms was 2024.                                                              

07:00 Method Of Arrival: Ambulatory                                                           jl7 

07:00 Acuity: YOSEF 4                                                                           jl7 

                                                                                                  

Triage Assessment:                                                                                

07:16 General: Appears in no apparent distress. uncomfortable, Behavior is calm, cooperative, jl7 

      appropriate for age. Pain: Denies pain. EENT: Dr. Meyer viewed q-tip .                 

                                                                                                  

Historical:                                                                                       

- Allergies:                                                                                      

07:16 No Known Allergies;                                                                     jl7 

- PMHx:                                                                                           

07:16 Atrial fibrillation; CHF; Hypertension;                                                 jl7 

                                                                                                  

- Immunization history:: Adult Immunizations unknown.                                             

- Infectious Disease History:: Denies.                                                            

- Family history:: not pertinent.                                                                 

- Social history:: Smoking status: unknown.                                                       

                                                                                                  

                                                                                                  

Screenin:17 [facility] ED Fall Risk Assessment (Adult) History of falling in the last 3 months,       jl7 

      including since admission No falls in past 3 months (0 pts) Confusion or Disorientation     

      No (0 pts) Intoxicated or Sedated No (0 pts) Impaired Gait No (0 pts) Mobility Assist       

      Device Used No (0 pt) Altered Elimination No (0 pt) Score/Fall Risk Level 0 - 2 = Low       

      Risk Oriented to surroundings, Maintained a safe environment. Abuse screen: Denies          

      threats or abuse. Denies injuries from another. Nutritional screening: No deficits          

      noted. Tuberculosis screening: No symptoms or risk factors identified.                      

                                                                                                  

Vital Signs:                                                                                      

07:00  / 93; Pulse 83; Resp 15; Pulse Ox 94% ; Pain 0/10;                               jl7 

07:00 Pain Scale: Adult                                                                       jl7 

                                                                                                  

ED Course:                                                                                        

06:58 Patient arrived in ED.                                                                  jj6 

06:59 Jesus Manuel Meyer MD is Attending Physician.                                            rt  

07:00 Chaitanya Hicks, RN is Primary Nurse.                                                      jl7 

07:16 Triage completed.                                                                       jl7 

07:16 Arm band placed on right wrist.                                                         jl7 

07:17 Patient has correct armband on for positive identification. Provided Education on:      jl7 

      discharge.                                                                                  

07:17 Assist provider with foreign body removal of q-tip from right ear canal. using          jl7 

      alligator clamps, Set up for procedure. Performed by Jesus Manuel Meyer MD Patient             

      tolerated well. Patient did not have IV access during this emergency room visit.            

                                                                                                  

Administered Medications:                                                                         

No medications were administered                                                                  

                                                                                                  

                                                                                                  

Medication:                                                                                       

07:17 VIS not applicable for this client.                                                     jl7 

                                                                                                  

Outcome:                                                                                          

07:10 Discharge ordered by MD.                                                                rt  

07:17 Discharged to home ambulatory,                                                          jl7 

07:17 Condition: stable                                                                           

07:17 Discharge instructions given to patient, Instructed on discharge instructions, follow       

      up and referral plans. Demonstrated understanding of instructions, follow-up care,          

07:19 Patient left the ED.                                                                    jl7 

                                                                                                  

Signatures:                                                                                       

Chaitanya Hicks RN                        RN   jl7                                                  

Jenifer Patel                           jj6                                                  

Jesus Manuel Meyer MD MD   rt                                                   

                                                                                                  

**************************************************************************************************

## 2024-10-05 NOTE — EDPHYS
Physician Documentation                                                                           

 [facility]                                                                 

Name: Chinyere Sarah III                                                                       

Age: 44 yrs                                                                                       

Sex: Male                                                                                         

: 1980                                                                                   

MRN: [id number]                                                                                   

Arrival Date: 10/05/2024                                                                          

Time: 06:54                                                                                       

Account#: [id number]                                                                            

Bed 6                                                                                             

Private MD:                                                                                       

ED Physician Jesus Manuel Meyer                                                                     

HPI:                                                                                              

10/05                                                                                             

07:12 This 44 yrs old  Male presents to ER via Unassigned with complaints of Foreign  rt  

      Body In Ear.                                                                                

07:12 Patient presents to the ED with foreign body to the right ear. Patient states that a    rt  

      Q-tip fell off this morning into his ear. Denies other complaints, symptoms are mild in     

      severity, no other aggravating or elevating factors..                                       

                                                                                                  

Historical:                                                                                       

- Allergies:                                                                                      

07:16 No Known Allergies;                                                                     jl7 

- PMHx:                                                                                           

07:16 Atrial fibrillation; CHF; Hypertension;                                                 jl7 

                                                                                                  

- Immunization history:: Adult Immunizations unknown.                                             

- Infectious Disease History:: Denies.                                                            

- Family history:: not pertinent.                                                                 

- Social history:: Smoking status: unknown.                                                       

                                                                                                  

                                                                                                  

ROS:                                                                                              

07:12 Constitutional: Negative for fever, chills, and weight loss, Cardiovascular: Negative   rt  

      for chest pain, palpitations, and edema, Respiratory: Negative for shortness of breath,     

      cough, wheezing, and pleuritic chest pain, Abdomen/GI: Negative for abdominal pain,         

      nausea, vomiting, diarrhea, and constipation, Skin: Negative for injury, rash, and          

      discoloration, Neuro: Negative for headache, weakness, numbness, tingling, and seizure,     

07:12 ENT: Positive for Ear pain, foreign body,                                                   

                                                                                                  

Exam:                                                                                             

07:12 Constitutional:  This is a well developed, well nourished patient who is awake, alert,  rt  

      and in no acute distress. Head/Face:  Normocephalic, atraumatic. Skin:  Warm, dry with      

      normal turgor.  Normal color with no rashes, no lesions, and no evidence of cellulitis.     

      MS/ Extremity:  Pulses equal, no cyanosis.  Neurovascular intact.  Full, normal range       

      of motion. Neuro:  Awake and alert, GCS 15, oriented to person, place, time, and            

      situation.  Cranial nerves II-XII grossly intact.  Motor strength 5/5 in all                

      extremities.  Sensory grossly intact.  Cerebellar exam normal.  Normal gait.                

07:12 ENT: Foreign body noted to the right EAC, mild erythema noted to the EAC, no drainage.      

                                                                                                  

Vital Signs:                                                                                      

07:00  / 93; Pulse 83; Resp 15; Pulse Ox 94% ; Pain 0/10;                               jl7 

07:00 Pain Scale: Adult                                                                       jl7 

                                                                                                  

Procedures:                                                                                       

07:12 Foreign Body Removal: Q-tip cotton, from the right ear canal, by using alligator        rt  

      clamps, The patient tolerated the removal well.                                             

                                                                                                  

MDM:                                                                                              

07:03 Patient medically screened.                                                             rt  

07:12 Differential Diagnosis Foreign body, TM perforation. Data reviewed: vital signs, nurses rt  

      notes. Counseling: I had a detailed discussion with the patient and/or guardian             

      regarding the historical points, exam findings, and any diagnostic results supporting       

      the discharge/admit diagnosis, the need for outpatient follow up, to return to the          

      emergency department if symptoms worsen or persist or if there are any questions or         

      concerns that arise at home. ED course: No TM perforation noted following foreign body      

      removal.                                                                                    

                                                                                                  

Administered Medications:                                                                         

No medications were administered                                                                  

                                                                                                  

                                                                                                  

Disposition Summary:                                                                              

10/05/24 07:10                                                                                    

Discharge Ordered                                                                                 

 Notes:       Location: Home                                                                        
  rt

      Problem: new                                                                            rt  

      Symptoms: are resolved                                                                  rt  

      Condition: Stable                                                                       rt  

      Diagnosis                                                                                   

        - Foreign body in right ear                                                           rt  

      Followup:                                                                               rt  

        - With: Private Physician                                                                  

        - When: 2 - 3 days                                                                         

        - Reason:                                                                                  

      Discharge Instructions:                                                                     

        - Discharge Summary Sheet                                                             rt  

        - Ear Foreign Body                                                                    rt  

      Forms:                                                                                      

        - Medication Reconciliation Form                                                      rt  

        - Antibiotic Education                                                                rt  

        - Prescription Opioid Use                                                             rt  

        - Patient Portal Instructions                                                         rt  

        - Leadership Thank You Letter                                                         rt  

Signatures:                                                                                       

Chaitanya Hicks, RN                        RN   jl7                                                  

Jesus Manuel Meyer MD MD   rt                                                   

                                                                                                  

**************************************************************************************************

## 2024-11-07 ENCOUNTER — HOSPITAL ENCOUNTER (EMERGENCY)
Dept: HOSPITAL 97 - ER | Age: 44
Discharge: HOME | End: 2024-11-07
Payer: SELF-PAY

## 2024-11-07 VITALS — SYSTOLIC BLOOD PRESSURE: 125 MMHG | DIASTOLIC BLOOD PRESSURE: 81 MMHG

## 2024-11-07 VITALS — TEMPERATURE: 98.2 F

## 2024-11-07 VITALS — OXYGEN SATURATION: 95 %

## 2024-11-07 DIAGNOSIS — I51.7: ICD-10-CM

## 2024-11-07 DIAGNOSIS — R07.89: Primary | ICD-10-CM

## 2024-11-07 DIAGNOSIS — G47.33: ICD-10-CM

## 2024-11-07 LAB
ALBUMIN SERPL BCP-MCNC: 3 G/DL (ref 3.4–5)
ALBUMIN/GLOB SERPL: 0.7 {RATIO} (ref 1.1–1.8)
ALP SERPL-CCNC: 76 U/L (ref 45–117)
ALT SERPL W P-5'-P-CCNC: 24 U/L (ref 16–61)
ANION GAP SERPL CALC-SCNC: 7.3 MEQ/L (ref 5–15)
AST SERPL W P-5'-P-CCNC: 13 U/L (ref 15–37)
BILIRUB INDIRECT SERPL-MCNC: 0.6 MG/DL (ref 0.2–0.8)
BUN BLD-MCNC: 16 MG/DL (ref 7–18)
GLOBULIN SER CALC-MCNC: 4.4 G/DL (ref 2.3–3.5)
GLUCOSE SERPLBLD-MCNC: 118 MG/DL (ref 74–106)
HCT VFR BLD CALC: 40.2 % (ref 39.6–49)
HGB BLD-MCNC: 13.6 G/DL (ref 13.6–17.9)
INR BLD: 1.25
LIPASE SERPL-CCNC: 18 U/L (ref 13–75)
LYMPHOCYTES # SPEC AUTO: 1 K/UL (ref 0.7–4.9)
MAGNESIUM SERPL-MCNC: 1.9 MG/DL (ref 1.6–2.4)
MCH RBC QN AUTO: 29.9 PG (ref 27–35)
MCHC RBC AUTO-ENTMCNC: 33.8 G/DL (ref 32–36)
MCV RBC: 88.4 FL (ref 80–100)
NRBC # BLD: 0 10*3/UL (ref 0–0)
NRBC BLD AUTO-RTO: 0.1 % (ref 0–0)
NT-PROBNP SERPL-MCNC: 47 PG/ML (ref ?–125)
PMV BLD: 7.5 FL (ref 7.6–11.3)
POTASSIUM SERPL-SCNC: 3.3 MEQ/L (ref 3.5–5.1)
PROTHROMBIN TIME: 13.9 SECONDS (ref 9.4–12.5)
RBC # BLD: 4.55 M/UL (ref 4.33–5.43)
TROPONIN I SERPL HS-MCNC: 4.5 PG/ML (ref ?–58.9)
TROPONIN I SERPL HS-MCNC: 4.7 PG/ML (ref ?–58.9)
TSH SERPL DL<=0.05 MIU/L-ACNC: 0.9 UIU/ML (ref 0.36–3.74)
WBC # BLD AUTO: 6.1 THOU/UL (ref 4.3–10.9)

## 2024-11-07 PROCEDURE — 83735 ASSAY OF MAGNESIUM: CPT

## 2024-11-07 PROCEDURE — 71045 X-RAY EXAM CHEST 1 VIEW: CPT

## 2024-11-07 PROCEDURE — 84439 ASSAY OF FREE THYROXINE: CPT

## 2024-11-07 PROCEDURE — 80076 HEPATIC FUNCTION PANEL: CPT

## 2024-11-07 PROCEDURE — 80048 BASIC METABOLIC PNL TOTAL CA: CPT

## 2024-11-07 PROCEDURE — 84484 ASSAY OF TROPONIN QUANT: CPT

## 2024-11-07 PROCEDURE — 83690 ASSAY OF LIPASE: CPT

## 2024-11-07 PROCEDURE — 85610 PROTHROMBIN TIME: CPT

## 2024-11-07 PROCEDURE — 36415 COLL VENOUS BLD VENIPUNCTURE: CPT

## 2024-11-07 PROCEDURE — 84443 ASSAY THYROID STIM HORMONE: CPT

## 2024-11-07 PROCEDURE — 85025 COMPLETE CBC W/AUTO DIFF WBC: CPT

## 2024-11-07 PROCEDURE — 93005 ELECTROCARDIOGRAM TRACING: CPT

## 2024-11-07 PROCEDURE — 83880 ASSAY OF NATRIURETIC PEPTIDE: CPT

## 2024-11-07 PROCEDURE — 99284 EMERGENCY DEPT VISIT MOD MDM: CPT
